# Patient Record
Sex: MALE | Race: WHITE | NOT HISPANIC OR LATINO | URBAN - METROPOLITAN AREA
[De-identification: names, ages, dates, MRNs, and addresses within clinical notes are randomized per-mention and may not be internally consistent; named-entity substitution may affect disease eponyms.]

---

## 2017-07-28 ENCOUNTER — OUTPATIENT (OUTPATIENT)
Dept: OUTPATIENT SERVICES | Facility: HOSPITAL | Age: 57
LOS: 1 days | End: 2017-07-28
Payer: COMMERCIAL

## 2017-07-28 PROCEDURE — 70552 MRI BRAIN STEM W/DYE: CPT | Mod: 26

## 2019-07-29 ENCOUNTER — APPOINTMENT (OUTPATIENT)
Dept: UROLOGY | Facility: CLINIC | Age: 59
End: 2019-07-29
Payer: COMMERCIAL

## 2019-07-29 VITALS
HEIGHT: 70 IN | BODY MASS INDEX: 29.49 KG/M2 | SYSTOLIC BLOOD PRESSURE: 144 MMHG | HEART RATE: 69 BPM | DIASTOLIC BLOOD PRESSURE: 83 MMHG | WEIGHT: 206 LBS | TEMPERATURE: 98 F

## 2019-07-29 DIAGNOSIS — Z80.1 FAMILY HISTORY OF MALIGNANT NEOPLASM OF TRACHEA, BRONCHUS AND LUNG: ICD-10-CM

## 2019-07-29 DIAGNOSIS — Z87.891 PERSONAL HISTORY OF NICOTINE DEPENDENCE: ICD-10-CM

## 2019-07-29 DIAGNOSIS — Z78.9 OTHER SPECIFIED HEALTH STATUS: ICD-10-CM

## 2019-07-29 PROCEDURE — 99203 OFFICE O/P NEW LOW 30 MIN: CPT

## 2019-07-29 NOTE — PHYSICAL EXAM
[General Appearance - Well Developed] : well developed [General Appearance - Well Nourished] : well nourished [Normal Appearance] : normal appearance [Well Groomed] : well groomed [General Appearance - In No Acute Distress] : no acute distress [Edema] : no peripheral edema [Respiration, Rhythm And Depth] : normal respiratory rhythm and effort [Exaggerated Use Of Accessory Muscles For Inspiration] : no accessory muscle use [Abdomen Soft] : soft [Abdomen Tenderness] : non-tender [Costovertebral Angle Tenderness] : no ~M costovertebral angle tenderness [Urethral Meatus] : meatus normal [Penis Abnormality] : normal circumcised penis [Urinary Bladder Findings] : the bladder was normal on palpation [Testes Tenderness] : no tenderness of the testes [Scrotum] : the scrotum was normal [Testes Mass (___cm)] : there were no testicular masses [Anus Abnormality] : the anus and perineum were normal [Prostate Enlargement] : the prostate was not enlarged [Prostate Tenderness] : the prostate was not tender [No Prostate Nodules] : no prostate nodules [Normal Station and Gait] : the gait and station were normal for the patient's age [] : no rash [No Focal Deficits] : no focal deficits [Oriented To Time, Place, And Person] : oriented to person, place, and time [Affect] : the affect was normal [Mood] : the mood was normal [Not Anxious] : not anxious [FreeTextEntry1] : large left spermatocele

## 2019-07-29 NOTE — HISTORY OF PRESENT ILLNESS
[FreeTextEntry1] : Mr. MICHAEL JIMENEZ 59 year old  M  no PMH and no PSH. Pt comes in bc of a left swollen testicle that he has had it for 4 years. Had a ultrasound about 3 years ago and was told nothing needs to be done unless it is bothersome. Pt states size really hasn't change much in size. States he is spending more time sitting which is making it more bothersome. 07/26/19 PSA 0.3. \par

## 2019-07-29 NOTE — ASSESSMENT
[FreeTextEntry1] : Mr. MICHAEL JIMENEZ 59 year old  M  no PMH and no PSH. Pt comes in bc of a left swollen testicle that he has had it for 4 years. Had a ultrasound about 3 years ago and was told nothing needs to be done unless it is bothersome. Pt states size really hasn't change much in size. States he is spending more time sitting which is making it more bothersome. 07/26/19 PSA 0.3. \par \par Spermatocele vs hydrocele. Pt would like to discuss surgical options\par \par Plan\par scrotal US\par fu with Dr. Spangler to discuss surgery\par

## 2019-08-01 ENCOUNTER — OUTPATIENT (OUTPATIENT)
Dept: OUTPATIENT SERVICES | Facility: HOSPITAL | Age: 59
LOS: 1 days | End: 2019-08-01
Payer: COMMERCIAL

## 2019-08-01 PROCEDURE — 76870 US EXAM SCROTUM: CPT | Mod: 26

## 2019-08-08 ENCOUNTER — APPOINTMENT (OUTPATIENT)
Dept: CARDIOLOGY | Facility: CLINIC | Age: 59
End: 2019-08-08
Payer: COMMERCIAL

## 2019-08-08 VITALS
BODY MASS INDEX: 29.49 KG/M2 | DIASTOLIC BLOOD PRESSURE: 72 MMHG | HEART RATE: 74 BPM | HEIGHT: 70 IN | OXYGEN SATURATION: 97 % | WEIGHT: 206 LBS | SYSTOLIC BLOOD PRESSURE: 116 MMHG

## 2019-08-08 DIAGNOSIS — Z86.39 PERSONAL HISTORY OF OTHER ENDOCRINE, NUTRITIONAL AND METABOLIC DISEASE: ICD-10-CM

## 2019-08-08 DIAGNOSIS — Z87.898 PERSONAL HISTORY OF OTHER SPECIFIED CONDITIONS: ICD-10-CM

## 2019-08-08 PROCEDURE — 99205 OFFICE O/P NEW HI 60 MIN: CPT

## 2019-08-08 NOTE — REASON FOR VISIT
[Consultation] : a consultation regarding [Abnormal ECG] : an abnormal ECG [Hyperlipidemia] : hyperlipidemia [FreeTextEntry1] : I saw this 59-year-old asymptomatic man in cardiac consultation on  08/08/19\par He stopped smoking one year ago, has untreated hyperlipidemia, right bundle branch block on his EKG with extrasystoles.\par

## 2019-08-08 NOTE — PHYSICAL EXAM
[General Appearance - Well Developed] : well developed [Normal Appearance] : normal appearance [Well Groomed] : well groomed [General Appearance - Well Nourished] : well nourished [No Deformities] : no deformities [General Appearance - In No Acute Distress] : no acute distress [Normal Conjunctiva] : the conjunctiva exhibited no abnormalities [Eyelids - No Xanthelasma] : the eyelids demonstrated no xanthelasmas [Normal Oral Mucosa] : normal oral mucosa [No Oral Pallor] : no oral pallor [No Oral Cyanosis] : no oral cyanosis [Normal Jugular Venous A Waves Present] : normal jugular venous A waves present [Normal Jugular Venous V Waves Present] : normal jugular venous V waves present [No Jugular Venous Veliz A Waves] : no jugular venous veliz A waves [Heart Rate And Rhythm] : heart rate and rhythm were normal [Heart Sounds] : normal S1 and S2 [Murmurs] : no murmurs present [Respiration, Rhythm And Depth] : normal respiratory rhythm and effort [Exaggerated Use Of Accessory Muscles For Inspiration] : no accessory muscle use [Auscultation Breath Sounds / Voice Sounds] : lungs were clear to auscultation bilaterally [Abdomen Soft] : soft [Abdomen Tenderness] : non-tender [Abdomen Mass (___ Cm)] : no abdominal mass palpated [Abnormal Walk] : normal gait [Gait - Sufficient For Exercise Testing] : the gait was sufficient for exercise testing [Nail Clubbing] : no clubbing of the fingernails [Cyanosis, Localized] : no localized cyanosis [Petechial Hemorrhages (___cm)] : no petechial hemorrhages [Skin Color & Pigmentation] : normal skin color and pigmentation [] : no rash [No Venous Stasis] : no venous stasis [Skin Lesions] : no skin lesions [No Skin Ulcers] : no skin ulcer [No Xanthoma] : no  xanthoma was observed [Oriented To Time, Place, And Person] : oriented to person, place, and time [Affect] : the affect was normal [Mood] : the mood was normal [No Anxiety] : not feeling anxious

## 2019-08-15 ENCOUNTER — APPOINTMENT (OUTPATIENT)
Dept: CARDIOLOGY | Facility: CLINIC | Age: 59
End: 2019-08-15

## 2019-08-21 ENCOUNTER — APPOINTMENT (OUTPATIENT)
Dept: CARDIOLOGY | Facility: CLINIC | Age: 59
End: 2019-08-21

## 2019-08-27 ENCOUNTER — APPOINTMENT (OUTPATIENT)
Dept: CARDIOLOGY | Facility: CLINIC | Age: 59
End: 2019-08-27
Payer: COMMERCIAL

## 2019-08-27 PROCEDURE — 0296T: CPT | Mod: 59

## 2019-08-27 PROCEDURE — 93015 CV STRESS TEST SUPVJ I&R: CPT

## 2019-08-28 ENCOUNTER — APPOINTMENT (OUTPATIENT)
Dept: UROLOGY | Facility: CLINIC | Age: 59
End: 2019-08-28
Payer: COMMERCIAL

## 2019-08-28 VITALS
HEIGHT: 70 IN | HEART RATE: 69 BPM | TEMPERATURE: 98.4 F | BODY MASS INDEX: 29.49 KG/M2 | SYSTOLIC BLOOD PRESSURE: 147 MMHG | WEIGHT: 206 LBS | DIASTOLIC BLOOD PRESSURE: 87 MMHG

## 2019-08-28 PROCEDURE — 99214 OFFICE O/P EST MOD 30 MIN: CPT

## 2019-08-28 NOTE — ASSESSMENT
[FreeTextEntry1] : Bilateral hydroceles but only left bothers patient.\par \par Plan:\par \par left hydrocelectomy\par I have discussed the risks, benefits and alternatives with the patient who agrees to proceed.

## 2019-08-28 NOTE — HISTORY OF PRESENT ILLNESS
[FreeTextEntry1] : Patient has noted scrotal swelling for a few years.  Never bothered him.  Lately sitting has become uncomfortable.  no difficulty voiding.  enlargement has been very gradual and none recently.

## 2019-08-28 NOTE — PHYSICAL EXAM
[General Appearance - Well Developed] : well developed [Normal Appearance] : normal appearance [General Appearance - Well Nourished] : well nourished [Well Groomed] : well groomed [Abdomen Soft] : soft [General Appearance - In No Acute Distress] : no acute distress [Abdomen Tenderness] : non-tender [Costovertebral Angle Tenderness] : no ~M costovertebral angle tenderness [Urinary Bladder Findings] : the bladder was normal on palpation [Urethral Meatus] : meatus normal [Testes Mass (___cm)] : there were no testicular masses [Scrotum] : the scrotum was normal [No Prostate Nodules] : no prostate nodules [Edema] : no peripheral edema [] : no respiratory distress [Respiration, Rhythm And Depth] : normal respiratory rhythm and effort [Exaggerated Use Of Accessory Muscles For Inspiration] : no accessory muscle use [Affect] : the affect was normal [Oriented To Time, Place, And Person] : oriented to person, place, and time [Mood] : the mood was normal [Not Anxious] : not anxious [Normal Station and Gait] : the gait and station were normal for the patient's age [No Focal Deficits] : no focal deficits [No Palpable Adenopathy] : no palpable adenopathy [FreeTextEntry1] : bilateral hydroceles L>>R

## 2019-08-28 NOTE — LETTER BODY
[Dear  ___] : Dear  [unfilled], [Courtesy Letter:] : I had the pleasure of seeing your patient, [unfilled], in my office today. [Please see my note below.] : Please see my note below. [Sincerely,] : Sincerely, [FreeTextEntry3] : Ed\par \par Jluis Spangler MD\par [facility]\par  of Urology\par [employer] [facility]\par

## 2019-09-05 PROCEDURE — 0298T: CPT

## 2019-09-24 ENCOUNTER — APPOINTMENT (OUTPATIENT)
Dept: CARDIOLOGY | Facility: CLINIC | Age: 59
End: 2019-09-24

## 2020-02-04 ENCOUNTER — APPOINTMENT (OUTPATIENT)
Dept: UROLOGY | Facility: CLINIC | Age: 60
End: 2020-02-04
Payer: COMMERCIAL

## 2020-02-04 VITALS
SYSTOLIC BLOOD PRESSURE: 128 MMHG | TEMPERATURE: 98.4 F | DIASTOLIC BLOOD PRESSURE: 76 MMHG | HEART RATE: 71 BPM | WEIGHT: 206 LBS | HEIGHT: 70 IN | BODY MASS INDEX: 29.49 KG/M2

## 2020-02-04 PROCEDURE — 99214 OFFICE O/P EST MOD 30 MIN: CPT

## 2020-02-04 RX ORDER — METOPROLOL SUCCINATE 50 MG/1
50 TABLET, EXTENDED RELEASE ORAL DAILY
Qty: 90 | Refills: 3 | Status: DISCONTINUED | COMMUNITY
Start: 2019-09-18 | End: 2020-02-04

## 2020-02-04 RX ORDER — ROSUVASTATIN CALCIUM 10 MG/1
10 TABLET, FILM COATED ORAL
Qty: 60 | Refills: 3 | Status: DISCONTINUED | COMMUNITY
Start: 2019-08-08 | End: 2020-02-04

## 2020-02-04 RX ORDER — MECLIZINE HCL 25 MG
25 TABLET ORAL
Refills: 0 | Status: DISCONTINUED | COMMUNITY
End: 2020-02-04

## 2020-02-04 RX ORDER — FLUTICASONE FUROATE 27.5 UG/1
27.5 SPRAY, METERED NASAL
Refills: 0 | Status: DISCONTINUED | COMMUNITY
End: 2020-02-04

## 2020-02-04 RX ORDER — MULTIVITAMIN
CAPSULE ORAL
Refills: 0 | Status: DISCONTINUED | COMMUNITY
End: 2020-02-04

## 2020-02-04 NOTE — HISTORY OF PRESENT ILLNESS
[FreeTextEntry1] : Patietn has left hydrocele which is uncomfortable. no swollen or reddened. no urinary issues. no fevers or chills.

## 2020-02-04 NOTE — PHYSICAL EXAM
[General Appearance - Well Developed] : well developed [General Appearance - Well Nourished] : well nourished [Normal Appearance] : normal appearance [Well Groomed] : well groomed [General Appearance - In No Acute Distress] : no acute distress [Abdomen Soft] : soft [Costovertebral Angle Tenderness] : no ~M costovertebral angle tenderness [Abdomen Tenderness] : non-tender [Urethral Meatus] : meatus normal [Penis Abnormality] : normal uncircumcised penis [Urinary Bladder Findings] : the bladder was normal on palpation [FreeTextEntry1] : large left hydrocele [Scrotum] : the scrotum was normal [Edema] : no peripheral edema [Exaggerated Use Of Accessory Muscles For Inspiration] : no accessory muscle use [] : no respiratory distress [Respiration, Rhythm And Depth] : normal respiratory rhythm and effort [Oriented To Time, Place, And Person] : oriented to person, place, and time [Mood] : the mood was normal [Affect] : the affect was normal [Not Anxious] : not anxious [Normal Station and Gait] : the gait and station were normal for the patient's age [No Focal Deficits] : no focal deficits

## 2020-02-04 NOTE — ASSESSMENT
[FreeTextEntry1] : Impression:\par \par left hydrocele\par \par Plan:\par left hydrocelectomy\par I have explained the risks, benefits and alternatives.

## 2020-02-26 ENCOUNTER — OUTPATIENT (OUTPATIENT)
Dept: OUTPATIENT SERVICES | Facility: HOSPITAL | Age: 60
LOS: 1 days | End: 2020-02-26

## 2020-03-10 ENCOUNTER — APPOINTMENT (OUTPATIENT)
Dept: UROLOGY | Facility: HOSPITAL | Age: 60
End: 2020-03-10

## 2020-05-31 ENCOUNTER — OUTPATIENT (OUTPATIENT)
Dept: OUTPATIENT SERVICES | Facility: HOSPITAL | Age: 60
LOS: 1 days | End: 2020-05-31

## 2020-12-06 ENCOUNTER — APPOINTMENT (OUTPATIENT)
Dept: DISASTER EMERGENCY | Facility: CLINIC | Age: 60
End: 2020-12-06

## 2020-12-07 LAB — SARS-COV-2 N GENE NPH QL NAA+PROBE: NOT DETECTED

## 2020-12-09 ENCOUNTER — OUTPATIENT (OUTPATIENT)
Dept: OUTPATIENT SERVICES | Facility: HOSPITAL | Age: 60
LOS: 1 days | End: 2020-12-09

## 2021-11-11 ENCOUNTER — NON-APPOINTMENT (OUTPATIENT)
Age: 61
End: 2021-11-11

## 2021-11-11 ENCOUNTER — APPOINTMENT (OUTPATIENT)
Dept: CARDIOLOGY | Facility: CLINIC | Age: 61
End: 2021-11-11
Payer: COMMERCIAL

## 2021-11-11 VITALS
BODY MASS INDEX: 28.63 KG/M2 | HEART RATE: 84 BPM | HEIGHT: 70 IN | DIASTOLIC BLOOD PRESSURE: 86 MMHG | TEMPERATURE: 97.3 F | WEIGHT: 200 LBS | OXYGEN SATURATION: 98 % | SYSTOLIC BLOOD PRESSURE: 126 MMHG

## 2021-11-11 PROCEDURE — 93000 ELECTROCARDIOGRAM COMPLETE: CPT

## 2021-11-11 PROCEDURE — 99215 OFFICE O/P EST HI 40 MIN: CPT

## 2021-11-11 NOTE — DISCUSSION/SUMMARY
[Father] : father [FreeTextEntry1] : 1) Crestor 10mg QD was not tolerated\par 2) Stress test was equivical\par 3) F/U in 6 months

## 2021-11-11 NOTE — REASON FOR VISIT
[Arrhythmia/ECG Abnorrmalities] : arrhythmia/ECG abnormalities [Hypertension] : hypertension [Consultation] : a consultation regarding [Abnormal ECG] : an abnormal ECG [Hyperlipidemia] : hyperlipidemia [FreeTextEntry3] : Dr. 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  He feels an occasional skipped beat maybe twice a month\par

## 2022-06-17 ENCOUNTER — APPOINTMENT (OUTPATIENT)
Dept: THORACIC SURGERY | Facility: CLINIC | Age: 62
End: 2022-06-17
Payer: COMMERCIAL

## 2022-06-17 VITALS
SYSTOLIC BLOOD PRESSURE: 158 MMHG | TEMPERATURE: 98 F | WEIGHT: 202 LBS | HEART RATE: 72 BPM | BODY MASS INDEX: 28.98 KG/M2 | OXYGEN SATURATION: 99 % | DIASTOLIC BLOOD PRESSURE: 93 MMHG

## 2022-06-17 PROCEDURE — 99202 OFFICE O/P NEW SF 15 MIN: CPT

## 2022-06-17 NOTE — ASSESSMENT
[FreeTextEntry1] : Mr. Brown has subcentimeter lung nodules and mediastinal lymphadenopathy.  The lesions appear to be benign, but need following.  He will return in six months with a surveillance cat scan chest.

## 2022-06-17 NOTE — PHYSICAL EXAM
[Neck Appearance] : the appearance of the neck was normal [Neck Cervical Mass (___cm)] : no neck mass was observed [] : no respiratory distress [Respiration, Rhythm And Depth] : normal respiratory rhythm and effort [Exaggerated Use Of Accessory Muscles For Inspiration] : no accessory muscle use [Examination Of The Chest] : the chest was normal in appearance [Chest Visual Inspection Thoracic Asymmetry] : no chest asymmetry [Bowel Sounds] : normal bowel sounds [Abdomen Soft] : soft [Abdomen Tenderness] : non-tender [Cervical Lymph Nodes Enlarged Posterior Bilaterally] : posterior cervical [Cervical Lymph Nodes Enlarged Anterior Bilaterally] : anterior cervical [Supraclavicular Lymph Nodes Enlarged Bilaterally] : supraclavicular [Oriented To Time, Place, And Person] : oriented to person, place, and time

## 2022-06-17 NOTE — HISTORY OF PRESENT ILLNESS
[FreeTextEntry1] : Mr. JIMENEZ is a 61 year old male referred by Dr. Trinidad who presents for consultation. His past medical history includes HLD< right bundle block, vertigo found to have mediastinal lymphadenopathy and right lung nodules.  He is here for an evaluation. \par \par

## 2022-11-10 ENCOUNTER — APPOINTMENT (OUTPATIENT)
Dept: CARDIOLOGY | Facility: CLINIC | Age: 62
End: 2022-11-10

## 2022-11-10 ENCOUNTER — NON-APPOINTMENT (OUTPATIENT)
Age: 62
End: 2022-11-10

## 2022-11-10 VITALS
HEIGHT: 70 IN | OXYGEN SATURATION: 98 % | HEART RATE: 67 BPM | WEIGHT: 200 LBS | SYSTOLIC BLOOD PRESSURE: 140 MMHG | TEMPERATURE: 96.9 F | DIASTOLIC BLOOD PRESSURE: 80 MMHG | BODY MASS INDEX: 28.63 KG/M2

## 2022-11-10 DIAGNOSIS — Z00.00 ENCOUNTER FOR GENERAL ADULT MEDICAL EXAMINATION W/OUT ABNORMAL FINDINGS: ICD-10-CM

## 2022-11-10 PROCEDURE — 93000 ELECTROCARDIOGRAM COMPLETE: CPT

## 2022-11-10 PROCEDURE — 99215 OFFICE O/P EST HI 40 MIN: CPT | Mod: 25

## 2022-11-10 NOTE — REASON FOR VISIT
[Arrhythmia/ECG Abnorrmalities] : arrhythmia/ECG abnormalities [Hypertension] : hypertension [Consultation] : a consultation regarding [Abnormal ECG] : an abnormal ECG [Hyperlipidemia] : hyperlipidemia [FreeTextEntry3] : Dr. 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  He feels an occasional skipped beat maybe twice a month\par Comes in today because his blood pressure has been running high.  He is not on any medication.

## 2022-11-10 NOTE — DISCUSSION/SUMMARY
[FreeTextEntry1] : 1) Crestor 10mg QD was not tolerated\par 2) Stress test was equivical because of the RBBB\par 3) we will get a cardiac CTA to evaluate whether he needs treatment for his lipids.  If his blood pressure remains elevated he will need medication for blood pressure.

## 2022-12-02 ENCOUNTER — APPOINTMENT (OUTPATIENT)
Dept: THORACIC SURGERY | Facility: CLINIC | Age: 62
End: 2022-12-02

## 2022-12-02 VITALS
DIASTOLIC BLOOD PRESSURE: 99 MMHG | TEMPERATURE: 98.2 F | OXYGEN SATURATION: 99 % | WEIGHT: 201.99 LBS | SYSTOLIC BLOOD PRESSURE: 171 MMHG | BODY MASS INDEX: 28.92 KG/M2 | HEIGHT: 70 IN | HEART RATE: 87 BPM

## 2022-12-02 PROCEDURE — 99212 OFFICE O/P EST SF 10 MIN: CPT

## 2022-12-02 NOTE — ASSESSMENT
[FreeTextEntry1] : Mr. Brown's most recent cat scan chest shows stable subcentimeter nodules and a slight decrease in his mediastinal lymphadenopathy.  He is clinically doing well.  I will have him obtain a cat scan chest in one year for surveillance purposes.

## 2022-12-02 NOTE — HISTORY OF PRESENT ILLNESS
[FreeTextEntry1] : Mr. JIMENEZ is a 61 year old male referred by Dr. Trinidad who presents for consultation. His past medical history includes HLD, right bundle block, vertigo found to have mediastinal lymphadenopathy and right lung nodules. \par \par He returns to the office today to discuss recent surveillance imaging.

## 2023-01-03 ENCOUNTER — NON-APPOINTMENT (OUTPATIENT)
Age: 63
End: 2023-01-03

## 2023-02-09 ENCOUNTER — NON-APPOINTMENT (OUTPATIENT)
Age: 63
End: 2023-02-09

## 2023-02-09 ENCOUNTER — APPOINTMENT (OUTPATIENT)
Dept: UROLOGY | Facility: CLINIC | Age: 63
End: 2023-02-09
Payer: COMMERCIAL

## 2023-02-09 VITALS
WEIGHT: 200 LBS | SYSTOLIC BLOOD PRESSURE: 137 MMHG | HEIGHT: 70 IN | BODY MASS INDEX: 28.63 KG/M2 | TEMPERATURE: 97.3 F | HEART RATE: 75 BPM | DIASTOLIC BLOOD PRESSURE: 84 MMHG

## 2023-02-09 PROCEDURE — 99203 OFFICE O/P NEW LOW 30 MIN: CPT

## 2023-02-10 NOTE — HISTORY OF PRESENT ILLNESS
[FreeTextEntry1] : 62-year-old patient presented today to discuss the option of operation for the large left hydrocele.\par Patient is suffering from the hydrocele for at least 2 years.  He was previously seen by Dr. Magallanes and was offered the surgery but because of the situation associated with COVID-19 he decided to postpone the surgery\par Today he is ready to consider the operation.  He told that varicocele is large enough to interfere with his everyday activities

## 2023-02-10 NOTE — ASSESSMENT
[FreeTextEntry1] : Patient has a left large hydrocele.\par He already was evaluated for these by Dr. Spangler\par I again is a combined hydrocele with several cavities that are  by each other by septations there is also the signs of debris's in the hydrocele.\par We discussed the process of the operation and the postoperative healing.\par I informed patient that he could not be actively involved in the physical activity and exercise connected with the bicycle and bike riding [place] driving for the long distance and lifting heavy objects for at least 2 weeks after the procedure\par I filled the paperwork and order for the operation room.

## 2023-02-10 NOTE — PHYSICAL EXAM
[General Appearance - Well Developed] : well developed [General Appearance - Well Nourished] : well nourished [Normal Appearance] : normal appearance [Well Groomed] : well groomed [General Appearance - In No Acute Distress] : no acute distress [Edema] : no peripheral edema [Respiration, Rhythm And Depth] : normal respiratory rhythm and effort [Exaggerated Use Of Accessory Muscles For Inspiration] : no accessory muscle use [Abdomen Soft] : soft [Abdomen Tenderness] : non-tender [Costovertebral Angle Tenderness] : no ~M costovertebral angle tenderness [Urethral Meatus] : meatus normal [Urinary Bladder Findings] : the bladder was normal on palpation [FreeTextEntry1] : Very large left hydrocele [Normal Station and Gait] : the gait and station were normal for the patient's age [] : no rash [No Focal Deficits] : no focal deficits [Oriented To Time, Place, And Person] : oriented to person, place, and time [Affect] : the affect was normal [Mood] : the mood was normal [Not Anxious] : not anxious [No Palpable Adenopathy] : no palpable adenopathy

## 2023-03-13 ENCOUNTER — APPOINTMENT (OUTPATIENT)
Dept: UROLOGY | Facility: HOSPITAL | Age: 63
End: 2023-03-13

## 2023-03-23 ENCOUNTER — APPOINTMENT (OUTPATIENT)
Dept: UROLOGY | Facility: CLINIC | Age: 63
End: 2023-03-23
Payer: COMMERCIAL

## 2023-03-23 VITALS
TEMPERATURE: 97.2 F | DIASTOLIC BLOOD PRESSURE: 111 MMHG | OXYGEN SATURATION: 98 % | HEIGHT: 70 IN | RESPIRATION RATE: 16 BRPM | HEART RATE: 73 BPM | SYSTOLIC BLOOD PRESSURE: 176 MMHG

## 2023-03-23 PROCEDURE — 99213 OFFICE O/P EST LOW 20 MIN: CPT | Mod: 24

## 2023-03-24 NOTE — ASSESSMENT
[FreeTextEntry1] : Plan\par tinea cruris\par start bactrim\par start nystatin\par fu 1 week with Dr. Castillo

## 2023-03-24 NOTE — HISTORY OF PRESENT ILLNESS
[FreeTextEntry1] : Pt comes in s/p hydrocelectomy on 3/13. Pt comes in for wound check.  Scrotal lesion clean dry and intake and healing well. Sight of groin lesion has slight redness consistent of tinea cruris. wound has no drainage. Incision site slightly open appx 5mm.

## 2023-03-29 ENCOUNTER — APPOINTMENT (OUTPATIENT)
Dept: UROLOGY | Facility: CLINIC | Age: 63
End: 2023-03-29
Payer: COMMERCIAL

## 2023-03-29 VITALS
TEMPERATURE: 97.3 F | HEART RATE: 71 BPM | DIASTOLIC BLOOD PRESSURE: 77 MMHG | SYSTOLIC BLOOD PRESSURE: 123 MMHG | WEIGHT: 197 LBS | BODY MASS INDEX: 28.2 KG/M2 | HEIGHT: 70 IN

## 2023-03-29 PROCEDURE — 99213 OFFICE O/P EST LOW 20 MIN: CPT | Mod: 24

## 2023-03-29 NOTE — ASSESSMENT
[FreeTextEntry1] : Patient feels good.\par We discussed the results of the pathology that showed tissue of appendix testicle.  The left inguinal skin lesion was described by pathologist as Acanthotic Seborrheic keratosis\par I will see patient in 3 weeks.

## 2023-03-29 NOTE — PHYSICAL EXAM
[General Appearance - Well Developed] : well developed [General Appearance - Well Nourished] : well nourished [Normal Appearance] : normal appearance [Well Groomed] : well groomed [General Appearance - In No Acute Distress] : no acute distress [Abdomen Soft] : soft [Abdomen Tenderness] : non-tender [Costovertebral Angle Tenderness] : no ~M costovertebral angle tenderness [Urethral Meatus] : meatus normal [Urinary Bladder Findings] : the bladder was normal on palpation [FreeTextEntry1] : no sing of infection  [Edema] : no peripheral edema [] : no respiratory distress [Respiration, Rhythm And Depth] : normal respiratory rhythm and effort [Exaggerated Use Of Accessory Muscles For Inspiration] : no accessory muscle use [Oriented To Time, Place, And Person] : oriented to person, place, and time [Affect] : the affect was normal [Mood] : the mood was normal [Not Anxious] : not anxious [Normal Station and Gait] : the gait and station were normal for the patient's age [No Focal Deficits] : no focal deficits [No Palpable Adenopathy] : no palpable adenopathy

## 2023-03-29 NOTE — HISTORY OF PRESENT ILLNESS
[FreeTextEntry1] : 62-year-old patient presented today for the postoperative follow-up.\par The procedure of left hydrocelectomy and excision of the suspicious left inguinal skin wart was done nearly 2 weeks ago.\par Patient feels fine his only concern is the pain and irritation of the left inguinal wound after the excision of the skin tag\par No chills no fever

## 2023-04-19 ENCOUNTER — APPOINTMENT (OUTPATIENT)
Dept: UROLOGY | Facility: CLINIC | Age: 63
End: 2023-04-19
Payer: COMMERCIAL

## 2023-04-19 VITALS
HEIGHT: 70 IN | WEIGHT: 197 LBS | TEMPERATURE: 97.3 F | HEART RATE: 73 BPM | SYSTOLIC BLOOD PRESSURE: 151 MMHG | DIASTOLIC BLOOD PRESSURE: 81 MMHG | BODY MASS INDEX: 28.2 KG/M2

## 2023-04-19 PROCEDURE — 99213 OFFICE O/P EST LOW 20 MIN: CPT | Mod: 24

## 2023-04-20 NOTE — ASSESSMENT
[FreeTextEntry1] : Patient postoperative wound showed no sign of infection however the skin in the left inguinal area has a sign of this infection.  We started with the treatment.

## 2023-04-20 NOTE — PHYSICAL EXAM
[General Appearance - Well Developed] : well developed [General Appearance - Well Nourished] : well nourished [Normal Appearance] : normal appearance [Well Groomed] : well groomed [General Appearance - In No Acute Distress] : no acute distress [Abdomen Soft] : soft [Costovertebral Angle Tenderness] : no ~M costovertebral angle tenderness [Abdomen Tenderness] : non-tender [Urethral Meatus] : meatus normal [Urinary Bladder Findings] : the bladder was normal on palpation [FreeTextEntry1] : Yeast infection in the left inguinal area [] : no respiratory distress [Edema] : no peripheral edema [Respiration, Rhythm And Depth] : normal respiratory rhythm and effort [Exaggerated Use Of Accessory Muscles For Inspiration] : no accessory muscle use [Oriented To Time, Place, And Person] : oriented to person, place, and time [Affect] : the affect was normal [Mood] : the mood was normal [Not Anxious] : not anxious [Normal Station and Gait] : the gait and station were normal for the patient's age [No Focal Deficits] : no focal deficits [No Palpable Adenopathy] : no palpable adenopathy

## 2023-04-26 ENCOUNTER — APPOINTMENT (OUTPATIENT)
Dept: UROLOGY | Facility: CLINIC | Age: 63
End: 2023-04-26
Payer: COMMERCIAL

## 2023-04-26 VITALS
OXYGEN SATURATION: 98 % | DIASTOLIC BLOOD PRESSURE: 85 MMHG | WEIGHT: 197 LBS | TEMPERATURE: 96.4 F | HEART RATE: 62 BPM | BODY MASS INDEX: 28.2 KG/M2 | HEIGHT: 70 IN | SYSTOLIC BLOOD PRESSURE: 157 MMHG

## 2023-04-26 DIAGNOSIS — B37.2 CANDIDIASIS OF SKIN AND NAIL: ICD-10-CM

## 2023-04-26 DIAGNOSIS — Z98.890 OTHER SPECIFIED POSTPROCEDURAL STATES: ICD-10-CM

## 2023-04-26 PROCEDURE — 99213 OFFICE O/P EST LOW 20 MIN: CPT | Mod: 24

## 2023-04-26 NOTE — HISTORY OF PRESENT ILLNESS
[FreeTextEntry1] : 62 year-old patient presented today for the postoperative follow-up.\par Nearly 6 weeks ago we did left hydrocelectomy and excision of the suspicious left inguinal skin wart \par His pathology showed benign lesion\par His main concern today is a exacerbation of the yeast infection in the left inguinal area and scrotum that causes uncomfortable feelings and sometimes pain\par We started treatment with the fluconazole and patient today feels much better

## 2023-04-26 NOTE — PHYSICAL EXAM
[General Appearance - Well Developed] : well developed [General Appearance - Well Nourished] : well nourished [Normal Appearance] : normal appearance [Well Groomed] : well groomed [General Appearance - In No Acute Distress] : no acute distress [Abdomen Soft] : soft [Abdomen Tenderness] : non-tender [Costovertebral Angle Tenderness] : no ~M costovertebral angle tenderness [Urethral Meatus] : meatus normal [Urinary Bladder Findings] : the bladder was normal on palpation [FreeTextEntry1] : Significant improvement of infection in the left inguinal area [Edema] : no peripheral edema [] : no respiratory distress [Respiration, Rhythm And Depth] : normal respiratory rhythm and effort [Exaggerated Use Of Accessory Muscles For Inspiration] : no accessory muscle use [Oriented To Time, Place, And Person] : oriented to person, place, and time [Affect] : the affect was normal [Mood] : the mood was normal [Not Anxious] : not anxious [Normal Station and Gait] : the gait and station were normal for the patient's age [No Focal Deficits] : no focal deficits [No Palpable Adenopathy] : no palpable adenopathy

## 2023-04-26 NOTE — ASSESSMENT
[FreeTextEntry1] : Patient feels much better.\par I recommended him just to continue with the antifungal creams.\par I will see him as needed

## 2023-08-22 ENCOUNTER — APPOINTMENT (OUTPATIENT)
Dept: CARDIOLOGY | Facility: CLINIC | Age: 63
End: 2023-08-22
Payer: MEDICARE

## 2023-08-22 VITALS — HEART RATE: 88 BPM | BODY MASS INDEX: 29.85 KG/M2 | WEIGHT: 208 LBS | OXYGEN SATURATION: 95 %

## 2023-08-22 DIAGNOSIS — N50.89 OTHER SPECIFIED DISORDERS OF THE MALE GENITAL ORGANS: ICD-10-CM

## 2023-08-22 DIAGNOSIS — I45.10 UNSPECIFIED RIGHT BUNDLE-BRANCH BLOCK: ICD-10-CM

## 2023-08-22 DIAGNOSIS — N43.40 SPERMATOCELE OF EPIDIDYMIS, UNSPECIFIED: ICD-10-CM

## 2023-08-22 DIAGNOSIS — B37.2 CANDIDIASIS OF SKIN AND NAIL: ICD-10-CM

## 2023-08-22 DIAGNOSIS — N43.3 HYDROCELE, UNSPECIFIED: ICD-10-CM

## 2023-08-22 DIAGNOSIS — I49.8 OTHER SPECIFIED CARDIAC ARRHYTHMIAS: ICD-10-CM

## 2023-08-22 DIAGNOSIS — Z01.818 ENCOUNTER FOR OTHER PREPROCEDURAL EXAMINATION: ICD-10-CM

## 2023-08-22 PROCEDURE — 99215 OFFICE O/P EST HI 40 MIN: CPT

## 2023-08-22 RX ORDER — DILTIAZEM HYDROCHLORIDE 120 MG/1
120 TABLET ORAL DAILY
Refills: 0 | Status: DISCONTINUED | COMMUNITY
End: 2023-08-22

## 2023-08-22 NOTE — HISTORY OF PRESENT ILLNESS
[FreeTextEntry1] :  63 years old  gentleman with history of abnormal EKG with right bundle branch block, PAF came for cardiac follow-up after discharge from [facility].  He was admitted on August 14, 2023 for chest pain, MI was ruled out.  I have reviewed the chart.  He was also diagnosed with new onset atrial fibrillation.  He was started on Eliquis for outpatient work-up.  He does get short of breath on more than usual exertion.  He denies any chest pain.  He denies PND, orthopnea, diaphoresis, dizziness, palpitation.  He has bilateral edema of the legs.  He has all the symptoms of sleep apnea syndrome including habitual snoring, daytime fatigue/somnolence.  No prior history of CHF, MI, syncope.  Echo in the hospital confirmed cardiomyopathy

## 2023-08-22 NOTE — REASON FOR VISIT
[Arrhythmia/ECG Abnorrmalities] : arrhythmia/ECG abnormalities [Hypertension] : hypertension [Abnormal ECG] : an abnormal ECG [FreeTextEntry3] : Dr. Trinidad

## 2023-08-22 NOTE — ASSESSMENT
[FreeTextEntry1] : Paroxysmal atrial fibrillation -I discussed diltiazem due to edema of the legs.  Continue metoprolol for rate control.  Continue Eliquis to prevent thromboembolism.  Cleared for DARRIN guided DC cardioversion has been discussed with him at length.  He understands risk, benefits, complex, indications.  He is agreeable and it will be planned over the next few days.  Cardiomyopathy -I discontinue diltiazem.  Asked continue metoprolol.  I have started him on Entresto 24/26 mg 1 tablet twice daily.  We will change metoprolol to Coreg in future.  There is no evidence of volume overload.  History of statin intolerance   Clinically no evidence of ACS or CHF.  Risk factor modification has been discussed with the great length in regular walking, compliance to medication, low-salt diet, alcohol abstinence..

## 2023-09-14 ENCOUNTER — APPOINTMENT (OUTPATIENT)
Dept: CARDIOLOGY | Facility: CLINIC | Age: 63
End: 2023-09-14
Payer: MEDICARE

## 2023-09-14 ENCOUNTER — NON-APPOINTMENT (OUTPATIENT)
Age: 63
End: 2023-09-14

## 2023-09-14 VITALS
HEART RATE: 93 BPM | SYSTOLIC BLOOD PRESSURE: 116 MMHG | WEIGHT: 194 LBS | OXYGEN SATURATION: 99 % | DIASTOLIC BLOOD PRESSURE: 78 MMHG | BODY MASS INDEX: 29.4 KG/M2 | HEIGHT: 68 IN

## 2023-09-14 PROCEDURE — 99215 OFFICE O/P EST HI 40 MIN: CPT

## 2023-09-14 PROCEDURE — 93000 ELECTROCARDIOGRAM COMPLETE: CPT

## 2023-09-16 ENCOUNTER — LABORATORY RESULT (OUTPATIENT)
Age: 63
End: 2023-09-16

## 2023-09-25 ENCOUNTER — OUTPATIENT (OUTPATIENT)
Dept: OUTPATIENT SERVICES | Facility: HOSPITAL | Age: 63
LOS: 1 days | End: 2023-09-25
Payer: MEDICARE

## 2023-09-25 DIAGNOSIS — G47.33 OBSTRUCTIVE SLEEP APNEA (ADULT) (PEDIATRIC): ICD-10-CM

## 2023-09-25 PROCEDURE — 95800 SLP STDY UNATTENDED: CPT

## 2023-09-25 PROCEDURE — G0400: CPT | Mod: 26

## 2023-10-03 ENCOUNTER — APPOINTMENT (OUTPATIENT)
Dept: CARDIOLOGY | Facility: CLINIC | Age: 63
End: 2023-10-03

## 2023-10-05 ENCOUNTER — APPOINTMENT (OUTPATIENT)
Dept: CARDIOLOGY | Facility: CLINIC | Age: 63
End: 2023-10-05
Payer: MEDICARE

## 2023-10-05 ENCOUNTER — NON-APPOINTMENT (OUTPATIENT)
Age: 63
End: 2023-10-05

## 2023-10-05 VITALS
HEIGHT: 68 IN | SYSTOLIC BLOOD PRESSURE: 124 MMHG | OXYGEN SATURATION: 99 % | BODY MASS INDEX: 28.34 KG/M2 | WEIGHT: 187 LBS | HEART RATE: 115 BPM | DIASTOLIC BLOOD PRESSURE: 72 MMHG

## 2023-10-05 PROCEDURE — 99214 OFFICE O/P EST MOD 30 MIN: CPT | Mod: 25

## 2023-10-05 PROCEDURE — 93000 ELECTROCARDIOGRAM COMPLETE: CPT

## 2023-11-03 ENCOUNTER — APPOINTMENT (OUTPATIENT)
Dept: ELECTROPHYSIOLOGY | Facility: CLINIC | Age: 63
End: 2023-11-03
Payer: MEDICARE

## 2023-11-03 VITALS
WEIGHT: 184 LBS | SYSTOLIC BLOOD PRESSURE: 150 MMHG | HEART RATE: 80 BPM | HEIGHT: 68 IN | DIASTOLIC BLOOD PRESSURE: 84 MMHG | BODY MASS INDEX: 27.89 KG/M2 | OXYGEN SATURATION: 98 %

## 2023-11-07 ENCOUNTER — NON-APPOINTMENT (OUTPATIENT)
Age: 63
End: 2023-11-07

## 2023-11-07 ENCOUNTER — APPOINTMENT (OUTPATIENT)
Dept: CARDIOLOGY | Facility: CLINIC | Age: 63
End: 2023-11-07
Payer: MEDICARE

## 2023-11-07 VITALS
WEIGHT: 190 LBS | DIASTOLIC BLOOD PRESSURE: 70 MMHG | OXYGEN SATURATION: 98 % | SYSTOLIC BLOOD PRESSURE: 124 MMHG | BODY MASS INDEX: 28.89 KG/M2 | HEART RATE: 78 BPM

## 2023-11-07 DIAGNOSIS — R07.89 OTHER CHEST PAIN: ICD-10-CM

## 2023-11-07 PROCEDURE — 99214 OFFICE O/P EST MOD 30 MIN: CPT

## 2023-11-07 RX ORDER — SACUBITRIL AND VALSARTAN 49; 51 MG/1; MG/1
49-51 TABLET, FILM COATED ORAL TWICE DAILY
Qty: 180 | Refills: 3 | Status: ACTIVE | COMMUNITY
Start: 1900-01-01 | End: 1900-01-01

## 2023-11-08 ENCOUNTER — APPOINTMENT (OUTPATIENT)
Dept: ELECTROPHYSIOLOGY | Facility: CLINIC | Age: 63
End: 2023-11-08
Payer: MEDICARE

## 2023-11-08 VITALS
OXYGEN SATURATION: 95 % | DIASTOLIC BLOOD PRESSURE: 76 MMHG | BODY MASS INDEX: 28.79 KG/M2 | HEIGHT: 68 IN | SYSTOLIC BLOOD PRESSURE: 120 MMHG | HEART RATE: 77 BPM | WEIGHT: 190 LBS

## 2023-11-08 DIAGNOSIS — G47.33 OBSTRUCTIVE SLEEP APNEA (ADULT) (PEDIATRIC): ICD-10-CM

## 2023-11-08 PROCEDURE — 99204 OFFICE O/P NEW MOD 45 MIN: CPT | Mod: 25

## 2023-11-08 PROCEDURE — 93000 ELECTROCARDIOGRAM COMPLETE: CPT

## 2023-11-10 ENCOUNTER — APPOINTMENT (OUTPATIENT)
Dept: CARDIOLOGY | Facility: CLINIC | Age: 63
End: 2023-11-10
Payer: MEDICARE

## 2023-11-10 PROCEDURE — 93306 TTE W/DOPPLER COMPLETE: CPT

## 2023-11-20 PROBLEM — G47.33 OSA (OBSTRUCTIVE SLEEP APNEA): Status: ACTIVE | Noted: 2023-08-22

## 2023-12-13 NOTE — REVIEW OF SYSTEMS
[Feeling Fatigued] : not feeling fatigued [SOB] : no shortness of breath [Cough] : no cough [Abdominal Pain] : no abdominal pain [Dizziness] : no dizziness [Easy Bleeding] : no tendency for easy bleeding

## 2023-12-13 NOTE — HISTORY OF PRESENT ILLNESS
[FreeTextEntry1] : Patient is a 63-year-old man who is seen in evaluation regarding atrial fibrillation. AF started over this past summer ( 2023): He was having some chest discomfort/shortness of breath and he called his physician recommended he go to emergency room.  He was picked up by EMT who then told him that he had A-fib.  He has had prior intermittent palpitations.  Patient was treated with furosemide as well and his diltiazem.  He was then noted to have a right bundle branch block as well.  His TSH was reported normal.  His troponins were normal.  D-dimers were elevated.  He had a CTA of the chest which was negative.  At that time he had had  moderate alcohol intake.  The patient had an echocardiogram performed that showed an EF of 35 to 40%.  He was anticoagulated with Eliquis 5 mg twice daily treated with diltiazem 120 mg 24 hours and metoprolol tartrate 50 mg twice daily. He underwent a DARRIN cardioversion August 25, 2023.  The patient was seen in follow-up September 14, 2023 and was noted to be in recurrent A-fib again with symptoms of reduced functional capacity.  He was then started on amiodarone.  He was scheduled to have another cardioversion procedure but on the day of the procedure he was noted to be in sinus rhythm.  He again saw his cardiologist on October 5, 2023 and again was noted to be in A-fib with moderate ventricular response.  He was maintained on amiodarone 200 mg/day.  His diltiazem was discontinued because of cardiomyopathy and symptoms.  Patient remained on Eliquis Entresto and metoprolol.  The patient was seen yesterday by his cardiologist and again A-fib was noted.  Current symptoms: The patient is a  and has been doing a lot of activities recently including walk-in and has no symptoms currently but previously of the summer he had had marked reduction in his functional capacity with fatigue and shortness of breath.  The patient is not aware of A-fib currently  He was consuming a moderate amount of alcohol up to the summer when his A-fib was diagnosed.  Since then he has markedly reduced his amount of alcohol intake.  Is also dropped about 25 pounds since then. He has a prior history of hypertension and moderate obstructive sleep apnea.  Echocardiogram performed 8/14/2023 showed EF 35 to 40% grade 1 diastolic dysfunction, right atrium was severely dilated, severe tricuspid regurg and moderate mitral regurg.  Sleep study performed 9/25/2023 showed an AHI of 17.7.  Coronary CT performed 12/19/2022: Calcium score 0 no CT evidence of coronary disease.  Multiple prominent borderline enlarged mediastinal and hilar lymph nodes bilaterally nonspecific 5 mm pulmonary nodule noted.  So thoracic surgery 6/17/2022 because of mediastinal lymphadenopathy and right lung nodule.  The feeling was the lesions were benign but needs surveillance CT scans.

## 2023-12-13 NOTE — DISCUSSION/SUMMARY
[FreeTextEntry1] : Impression this is a 63-year-old  who is very active who presented with symptoms of shortness of breath fatigue and chest discomfort in December 2023.  He was noted to be in A-fib with rapid ventricular response.  Patient was started on anticoagulation and rate controlled and had had a subsequent DARRIN cardioversion.  He had a recurrence and was started on amiodarone and prior to his second cardioversion he had spontaneously converted.  He has been doing well since with improvement in symptoms.  Of note the patient has had decreased left ventricular function with an EF 35 to 40%.  He was diuresed and started on Entresto.  He is still on amiodarone 200 mg/day.  Patient is on metoprolol 50 mg/day.  Calcium channel blocker was discontinued.  He has had a baseline right bundle branch block.  His echocardiogram had shown EF 35 to 40%, severely dilated right atrium with tricuspid regurgitation and moderate mitral regurgitation.  The left atrial volume index was not commented on.  Presumably this patient had A-fib and a tachy myopathy.  They might have been contribution from his alcohol intake as well.  He is now having difficulty maintaining sinus rhythm on amiodarone.  The patient would benefit from maintenance of sinus rhythm given the fact that his rate control is not complete.  discussed option regarding catheter ablation - he is deciding.

## 2023-12-29 PROBLEM — R59.0 LYMPHADENOPATHY, MEDIASTINAL: Status: ACTIVE | Noted: 2022-11-30

## 2023-12-29 PROBLEM — R91.1 LUNG NODULE: Status: ACTIVE | Noted: 2023-12-29

## 2023-12-29 NOTE — DATA REVIEWED
[FreeTextEntry1] : 11/17/2023 [facility]. Name: Hendricks Andrea [facility] Phys[facility] Address: [address] [place] [address], [facility]. Phone: [phone] CT-CHEST NON CONTRAST  History: Z87.891 Smoker prior R59.9  Technique: CT scan of the chest was performed without contrast. Axial, sagittal and coronal images were reconstructed. This study was performed using automatic exposure control and an iterative reconstruction technique (radiation dose reduction software) to obtain a diagnostic image quality scan with patient dose as low as reasonably achievable. The mA and kV were adjusted according to patient size. The administered radiation dose was 4.13 mSv.   COMPARISON: CT 11/28/2022.  THYROID: The visualized thyroid gland is unremarkable.  MEDIASTINUM / AIDEN / LYMPH NODES: Stable reactive-appearing mediastinal nodes, without suspicious lymphadenopathy or mass.  HEART / VASCULAR STRUCTURES: Normal heart size. No pericardial effusion. Normal caliber of the aorta and pulmonary artery.  LUNGS: There is bronchial thickening and a stable lymph node abutting the minor fissure (series 15 image 175), without suspicious nodule or infiltrate.  PLEURA: There is no pleural effusion.  UPPER ABDOMEN: Punctate left kidney stone.  BONES: No acute fracture or aggressive focal lesion noted.   IMPRESSION:   Stable reactive mediastinal lymph nodes.  Bronchial thickening, without suspicious nodule.  Signed by: Kingsley Mariee MD Signed Date: 11/22/2023 7:31 PM EST    SIGNED BY: Kingsley Mariee M.D., Ext. [phone] 11/22/2023 07:31 PM

## 2023-12-29 NOTE — REASON FOR VISIT
[Home] : at home, [unfilled] , at the time of the visit. [Medical Office: ([facility])___] : at the medical office located in  [This encounter was initiated by telehealth (audio with video) and converted to telephone (audio only) due to technical difficulties.] : This encounter was initiated by telehealth (audio with video) and converted to telephone (audio only) due to technical difficulties. [Follow-Up: _____] : a [unfilled] follow-up visit

## 2023-12-29 NOTE — HISTORY OF PRESENT ILLNESS
[FreeTextEntry1] : Mr. MICHAEL JIMENEZ is a 63 year male who presents today for follow-up. Previously, he has been followed for mediastinal lymphadenopathy and right lung nodules. He was initially seen 6/2022 and at his last visit in December 2022, he was noted to have stable subcentimeter nodules with a slight decrease in mediastinal lymphadenopathy. He presents today to review and discuss surveillance annual CT Chest results.    Additional past medical history includes hyperlipidemia, right bundle branch block and vertigo   Today, the patient reports         Referring: Vivi

## 2024-01-04 ENCOUNTER — APPOINTMENT (OUTPATIENT)
Dept: THORACIC SURGERY | Facility: CLINIC | Age: 64
End: 2024-01-04

## 2024-01-04 DIAGNOSIS — R91.1 SOLITARY PULMONARY NODULE: ICD-10-CM

## 2024-01-04 DIAGNOSIS — R59.0 LOCALIZED ENLARGED LYMPH NODES: ICD-10-CM

## 2024-01-10 ENCOUNTER — APPOINTMENT (OUTPATIENT)
Dept: CARDIOLOGY | Facility: CLINIC | Age: 64
End: 2024-01-10

## 2024-01-11 ENCOUNTER — OUTPATIENT (OUTPATIENT)
Dept: OUTPATIENT SERVICES | Facility: HOSPITAL | Age: 64
LOS: 1 days | End: 2024-01-11
Payer: MEDICARE

## 2024-01-11 VITALS
OXYGEN SATURATION: 98 % | WEIGHT: 187.39 LBS | RESPIRATION RATE: 16 BRPM | SYSTOLIC BLOOD PRESSURE: 120 MMHG | TEMPERATURE: 98 F | HEIGHT: 68 IN | DIASTOLIC BLOOD PRESSURE: 75 MMHG | HEART RATE: 72 BPM

## 2024-01-11 DIAGNOSIS — Z01.818 ENCOUNTER FOR OTHER PREPROCEDURAL EXAMINATION: ICD-10-CM

## 2024-01-11 DIAGNOSIS — Z98.890 OTHER SPECIFIED POSTPROCEDURAL STATES: Chronic | ICD-10-CM

## 2024-01-11 LAB
ANION GAP SERPL CALC-SCNC: 12 MMOL/L — SIGNIFICANT CHANGE UP (ref 5–17)
ANION GAP SERPL CALC-SCNC: 12 MMOL/L — SIGNIFICANT CHANGE UP (ref 5–17)
APTT BLD: 33.8 SEC — SIGNIFICANT CHANGE UP (ref 24.5–35.6)
APTT BLD: 33.8 SEC — SIGNIFICANT CHANGE UP (ref 24.5–35.6)
BASOPHILS # BLD AUTO: 0.05 K/UL — SIGNIFICANT CHANGE UP (ref 0–0.2)
BASOPHILS # BLD AUTO: 0.05 K/UL — SIGNIFICANT CHANGE UP (ref 0–0.2)
BASOPHILS NFR BLD AUTO: 0.7 % — SIGNIFICANT CHANGE UP (ref 0–2)
BASOPHILS NFR BLD AUTO: 0.7 % — SIGNIFICANT CHANGE UP (ref 0–2)
BLD GP AB SCN SERPL QL: SIGNIFICANT CHANGE UP
BLD GP AB SCN SERPL QL: SIGNIFICANT CHANGE UP
BUN SERPL-MCNC: 18.6 MG/DL — SIGNIFICANT CHANGE UP (ref 8–20)
BUN SERPL-MCNC: 18.6 MG/DL — SIGNIFICANT CHANGE UP (ref 8–20)
CALCIUM SERPL-MCNC: 9.6 MG/DL — SIGNIFICANT CHANGE UP (ref 8.4–10.5)
CALCIUM SERPL-MCNC: 9.6 MG/DL — SIGNIFICANT CHANGE UP (ref 8.4–10.5)
CHLORIDE SERPL-SCNC: 102 MMOL/L — SIGNIFICANT CHANGE UP (ref 96–108)
CHLORIDE SERPL-SCNC: 102 MMOL/L — SIGNIFICANT CHANGE UP (ref 96–108)
CHOLEST SERPL-MCNC: 282 MG/DL — HIGH
CHOLEST SERPL-MCNC: 282 MG/DL — HIGH
CO2 SERPL-SCNC: 27 MMOL/L — SIGNIFICANT CHANGE UP (ref 22–29)
CO2 SERPL-SCNC: 27 MMOL/L — SIGNIFICANT CHANGE UP (ref 22–29)
CREAT SERPL-MCNC: 1.11 MG/DL — SIGNIFICANT CHANGE UP (ref 0.5–1.3)
CREAT SERPL-MCNC: 1.11 MG/DL — SIGNIFICANT CHANGE UP (ref 0.5–1.3)
EGFR: 74 ML/MIN/1.73M2 — SIGNIFICANT CHANGE UP
EGFR: 74 ML/MIN/1.73M2 — SIGNIFICANT CHANGE UP
EOSINOPHIL # BLD AUTO: 0.23 K/UL — SIGNIFICANT CHANGE UP (ref 0–0.5)
EOSINOPHIL # BLD AUTO: 0.23 K/UL — SIGNIFICANT CHANGE UP (ref 0–0.5)
EOSINOPHIL NFR BLD AUTO: 3.4 % — SIGNIFICANT CHANGE UP (ref 0–6)
EOSINOPHIL NFR BLD AUTO: 3.4 % — SIGNIFICANT CHANGE UP (ref 0–6)
GLUCOSE SERPL-MCNC: 98 MG/DL — SIGNIFICANT CHANGE UP (ref 70–99)
GLUCOSE SERPL-MCNC: 98 MG/DL — SIGNIFICANT CHANGE UP (ref 70–99)
HCT VFR BLD CALC: 49.9 % — SIGNIFICANT CHANGE UP (ref 39–50)
HCT VFR BLD CALC: 49.9 % — SIGNIFICANT CHANGE UP (ref 39–50)
HDLC SERPL-MCNC: 74 MG/DL — SIGNIFICANT CHANGE UP
HDLC SERPL-MCNC: 74 MG/DL — SIGNIFICANT CHANGE UP
HGB BLD-MCNC: 16.4 G/DL — SIGNIFICANT CHANGE UP (ref 13–17)
HGB BLD-MCNC: 16.4 G/DL — SIGNIFICANT CHANGE UP (ref 13–17)
IMM GRANULOCYTES NFR BLD AUTO: 0.6 % — SIGNIFICANT CHANGE UP (ref 0–0.9)
IMM GRANULOCYTES NFR BLD AUTO: 0.6 % — SIGNIFICANT CHANGE UP (ref 0–0.9)
INR BLD: 1.04 RATIO — SIGNIFICANT CHANGE UP (ref 0.85–1.18)
INR BLD: 1.04 RATIO — SIGNIFICANT CHANGE UP (ref 0.85–1.18)
LIPID PNL WITH DIRECT LDL SERPL: 171 MG/DL — HIGH
LIPID PNL WITH DIRECT LDL SERPL: 171 MG/DL — HIGH
LYMPHOCYTES # BLD AUTO: 2.12 K/UL — SIGNIFICANT CHANGE UP (ref 1–3.3)
LYMPHOCYTES # BLD AUTO: 2.12 K/UL — SIGNIFICANT CHANGE UP (ref 1–3.3)
LYMPHOCYTES # BLD AUTO: 31.3 % — SIGNIFICANT CHANGE UP (ref 13–44)
LYMPHOCYTES # BLD AUTO: 31.3 % — SIGNIFICANT CHANGE UP (ref 13–44)
MAGNESIUM SERPL-MCNC: 2.1 MG/DL — SIGNIFICANT CHANGE UP (ref 1.6–2.6)
MAGNESIUM SERPL-MCNC: 2.1 MG/DL — SIGNIFICANT CHANGE UP (ref 1.6–2.6)
MCHC RBC-ENTMCNC: 31.8 PG — SIGNIFICANT CHANGE UP (ref 27–34)
MCHC RBC-ENTMCNC: 31.8 PG — SIGNIFICANT CHANGE UP (ref 27–34)
MCHC RBC-ENTMCNC: 32.9 GM/DL — SIGNIFICANT CHANGE UP (ref 32–36)
MCHC RBC-ENTMCNC: 32.9 GM/DL — SIGNIFICANT CHANGE UP (ref 32–36)
MCV RBC AUTO: 96.7 FL — SIGNIFICANT CHANGE UP (ref 80–100)
MCV RBC AUTO: 96.7 FL — SIGNIFICANT CHANGE UP (ref 80–100)
MONOCYTES # BLD AUTO: 0.64 K/UL — SIGNIFICANT CHANGE UP (ref 0–0.9)
MONOCYTES # BLD AUTO: 0.64 K/UL — SIGNIFICANT CHANGE UP (ref 0–0.9)
MONOCYTES NFR BLD AUTO: 9.5 % — SIGNIFICANT CHANGE UP (ref 2–14)
MONOCYTES NFR BLD AUTO: 9.5 % — SIGNIFICANT CHANGE UP (ref 2–14)
NEUTROPHILS # BLD AUTO: 3.69 K/UL — SIGNIFICANT CHANGE UP (ref 1.8–7.4)
NEUTROPHILS # BLD AUTO: 3.69 K/UL — SIGNIFICANT CHANGE UP (ref 1.8–7.4)
NEUTROPHILS NFR BLD AUTO: 54.5 % — SIGNIFICANT CHANGE UP (ref 43–77)
NEUTROPHILS NFR BLD AUTO: 54.5 % — SIGNIFICANT CHANGE UP (ref 43–77)
NON HDL CHOLESTEROL: 208 MG/DL — HIGH
NON HDL CHOLESTEROL: 208 MG/DL — HIGH
PLATELET # BLD AUTO: 261 K/UL — SIGNIFICANT CHANGE UP (ref 150–400)
PLATELET # BLD AUTO: 261 K/UL — SIGNIFICANT CHANGE UP (ref 150–400)
POTASSIUM SERPL-MCNC: 4.8 MMOL/L — SIGNIFICANT CHANGE UP (ref 3.5–5.3)
POTASSIUM SERPL-MCNC: 4.8 MMOL/L — SIGNIFICANT CHANGE UP (ref 3.5–5.3)
POTASSIUM SERPL-SCNC: 4.8 MMOL/L — SIGNIFICANT CHANGE UP (ref 3.5–5.3)
POTASSIUM SERPL-SCNC: 4.8 MMOL/L — SIGNIFICANT CHANGE UP (ref 3.5–5.3)
PROTHROM AB SERPL-ACNC: 11.5 SEC — SIGNIFICANT CHANGE UP (ref 9.5–13)
PROTHROM AB SERPL-ACNC: 11.5 SEC — SIGNIFICANT CHANGE UP (ref 9.5–13)
RBC # BLD: 5.16 M/UL — SIGNIFICANT CHANGE UP (ref 4.2–5.8)
RBC # BLD: 5.16 M/UL — SIGNIFICANT CHANGE UP (ref 4.2–5.8)
RBC # FLD: 16.9 % — HIGH (ref 10.3–14.5)
RBC # FLD: 16.9 % — HIGH (ref 10.3–14.5)
SODIUM SERPL-SCNC: 140 MMOL/L — SIGNIFICANT CHANGE UP (ref 135–145)
SODIUM SERPL-SCNC: 140 MMOL/L — SIGNIFICANT CHANGE UP (ref 135–145)
TRIGL SERPL-MCNC: 183 MG/DL — HIGH
TRIGL SERPL-MCNC: 183 MG/DL — HIGH
WBC # BLD: 6.77 K/UL — SIGNIFICANT CHANGE UP (ref 3.8–10.5)
WBC # BLD: 6.77 K/UL — SIGNIFICANT CHANGE UP (ref 3.8–10.5)
WBC # FLD AUTO: 6.77 K/UL — SIGNIFICANT CHANGE UP (ref 3.8–10.5)
WBC # FLD AUTO: 6.77 K/UL — SIGNIFICANT CHANGE UP (ref 3.8–10.5)

## 2024-01-11 PROCEDURE — 93010 ELECTROCARDIOGRAM REPORT: CPT

## 2024-01-11 PROCEDURE — 93005 ELECTROCARDIOGRAM TRACING: CPT

## 2024-01-11 PROCEDURE — G0463: CPT

## 2024-01-11 RX ORDER — APIXABAN 2.5 MG/1
1 TABLET, FILM COATED ORAL
Refills: 0 | DISCHARGE

## 2024-01-11 RX ORDER — AMIODARONE HYDROCHLORIDE 400 MG/1
1 TABLET ORAL
Refills: 0 | DISCHARGE

## 2024-01-11 NOTE — H&P PST ADULT - CHARACTER OF SYSTOLIC MURMUR
lightheaded/dizziness/nausea x 6 weeks. pt states she is also seeing floaters murmur loudness: II/VI/apex

## 2024-01-11 NOTE — H&P PST ADULT - HISTORY OF PRESENT ILLNESS
Department of Cardiology                                                                  [facility]/[place] [address] [address] [facility]                                                            Telephone: [phone]. Fax:[phone]                                                                                    PST H & P     HPI:  Patient is a 63-year-old man who is scheduled for DARRIN and atrial fibrillation ablation.  AF started over this past summer ( 2023): He was having some chest discomfort/shortness of breath and he called his physician recommended he go to emergency room. He was picked up by EMT who then told him that he had A-fib. He has had prior intermittent palpitations. Patient was treated with furosemide as well and his diltiazem. He was then noted to have a right bundle branch block as well. His TSH was reported normal. His troponins were normal. D-dimers were elevated. He had a CTA of the chest which was negative. At that time he had had moderate alcohol intake. The patient had an echocardiogram performed that showed an EF of 35 to 40%. He was anticoagulated with Eliquis 5 mg twice daily treated with diltiazem 120 mg 24 hours and metoprolol tartrate 50 mg twice daily.  He underwent a DARRIN cardioversion August 25, 2023.  ?  The patient was seen in follow-up September 14, 2023 and was noted to be in recurrent A-fib again with symptoms of reduced functional capacity. He was then started on amiodarone. He was scheduled to have another cardioversion procedure but on the day of the procedure he was noted to be in sinus rhythm. He again saw his cardiologist on October 5, 2023 and again was noted to be in A-fib with moderate ventricular response. He was maintained on amiodarone 200 mg/day. His diltiazem was discontinued because of cardiomyopathy and symptoms. Patient remained on Eliquis Entresto and metoprolol.  ?  The patient was seen yesterday by his cardiologist and again A-fib was noted.  ?  Current symptoms: The patient is a  and has been doing a lot of activities recently including walk-in and has no symptoms currently but previously of the summer he had had marked reduction in his functional capacity with fatigue and shortness of breath. The patient is not aware of A-fib currently  ?  He was consuming a moderate amount of alcohol up to the summer when his A-fib was diagnosed. Since then he has markedly reduced his amount of alcohol intake. Is also dropped about 25 pounds since then.  He has a prior history of hypertension and moderate obstructive sleep apnea.  ?  Echocardiogram performed 8/14/2023 showed EF 35 to 40% grade 1 diastolic dysfunction, right atrium was severely dilated, severe tricuspid regurg and moderate mitral regurg.  ?  Sleep study performed 9/25/2023 showed an AHI of 17.7.  ?  Coronary CT performed 12/19/2022: Calcium score 0 no CT evidence of coronary disease. Multiple prominent borderline enlarged mediastinal and hilar lymph nodes bilaterally nonspecific 5 mm pulmonary nodule noted.  ?  So thoracic surgery 6/17/2022 because of mediastinal lymphadenopathy and right lung nodule. The feeling was the lesions were benign but needs surveillance CT scans.  ?  Heart Failure:        Systolic:        NYHA Class (within 2 weeks): II    Assessment of LVEF:       EF: 35-40%       Assessed by: echo       Date: 8/2023          Antianginal Therapies:        Beta Blockers:  metoprolol tartrate       Calcium Channel Blockers:        Long Acting Nitrates:        Ranexa:     Associated Risk Factors:        Cerebrovascular Disease: N/A       Chronic Lung Disease: N/A       Peripheral Arterial Disease: N/A       Chronic Kidney Disease (if yes, what is GFR): N/A       Uncontrolled Diabetes (if yes, what is HgbA1C or FBS): N/A       Poorly Controlled Hypertension (if yes, what is SBP): N/A       Morbid Obesity (if yes, what is BMI): N/A       History of Recent Ventricular Arrhythmia: N/A       Inability to Ambulate Safely: N/A       Need for Therapeutic Anticoagulation: Eliquis       Antiplatelet or Contrast Allergy: N/A             Antiarrhythmic Therapies: amiodarone     Anticoagulation Therapies:    Eliquis  	  ROS: as stated above, otherwise negative      	    LABS:	 	                                                                                                                     Department of Cardiology                                                                  [facility]/[place] [address] [address] [facility]                                                            Telephone: [phone]. Fax:[phone]                                                                                    PST H & P     HPI:  Patient is a 63-year-old man who is scheduled for DARRIN and atrial fibrillation ablation.  AF started over this past summer ( 2023): He was having some chest discomfort/shortness of breath and he called his physician recommended he go to emergency room. He was picked up by EMT who then told him that he had A-fib. He has had prior intermittent palpitations. Patient was treated with furosemide as well and his diltiazem. He was then noted to have a right bundle branch block as well. His TSH was reported normal. His troponins were normal. D-dimers were elevated. He had a CTA of the chest which was negative. At that time he had had moderate alcohol intake. The patient had an echocardiogram performed that showed an EF of 35 to 40%. He was anticoagulated with Eliquis 5 mg twice daily treated with diltiazem 120 mg 24 hours and metoprolol tartrate 50 mg twice daily.  He underwent a DARRIN cardioversion August 25, 2023.  ?  The patient was seen in follow-up September 14, 2023 and was noted to be in recurrent A-fib again with symptoms of reduced functional capacity. He was then started on amiodarone. He was scheduled to have another cardioversion procedure but on the day of the procedure he was noted to be in sinus rhythm. He again saw his cardiologist on October 5, 2023 and again was noted to be in A-fib with moderate ventricular response. He was maintained on amiodarone 200 mg/day. His diltiazem was discontinued because of cardiomyopathy and symptoms. Patient remained on Eliquis Entresto and metoprolol.  ?  The patient was seen yesterday by his cardiologist and again A-fib was noted.  ?  Current symptoms: The patient is a  and has been doing a lot of activities recently including walk-in and has no symptoms currently but previously of the summer he had had marked reduction in his functional capacity with fatigue and shortness of breath. The patient is not aware of A-fib currently  ?  He was consuming a moderate amount of alcohol up to the summer when his A-fib was diagnosed. Since then he has markedly reduced his amount of alcohol intake. Is also dropped about 25 pounds since then.  He has a prior history of hypertension and moderate obstructive sleep apnea.  ?  Echocardiogram performed 8/14/2023 showed EF 35 to 40% grade 1 diastolic dysfunction, right atrium was severely dilated, severe tricuspid regurg and moderate mitral regurg.  ?  Sleep study performed 9/25/2023 showed an AHI of 17.7.  ?  Coronary CT performed 12/19/2022: Calcium score 0 no CT evidence of coronary disease. Multiple prominent borderline enlarged mediastinal and hilar lymph nodes bilaterally nonspecific 5 mm pulmonary nodule noted.  ?  So thoracic surgery 6/17/2022 because of mediastinal lymphadenopathy and right lung nodule. The feeling was the lesions were benign but needs surveillance CT scans.  ?  Heart Failure:        Systolic:        NYHA Class (within 2 weeks): II    Assessment of LVEF:       EF: 35-40%       Assessed by: echo       Date: 8/2023          Antianginal Therapies:        Beta Blockers:  metoprolol tartrate       Calcium Channel Blockers:        Long Acting Nitrates:        Ranexa:     Associated Risk Factors:        Cerebrovascular Disease: N/A       Chronic Lung Disease: N/A       Peripheral Arterial Disease: N/A       Chronic Kidney Disease (if yes, what is GFR): N/A       Uncontrolled Diabetes (if yes, what is HgbA1C or FBS): N/A       Poorly Controlled Hypertension (if yes, what is SBP): N/A       Morbid Obesity (if yes, what is BMI): N/A       History of Recent Ventricular Arrhythmia: N/A       Inability to Ambulate Safely: N/A       Need for Therapeutic Anticoagulation: Eliquis       Antiplatelet or Contrast Allergy: N/A             Antiarrhythmic Therapies: amiodarone     Anticoagulation Therapies:    Eliquis  	  ROS: as stated above, otherwise negative      	    LABS:	 	                                                                                                                     Department of Cardiology                                                                  [facility]/[place] [address] [address] [facility]                                                            Telephone: [phone]. Fax:[phone]                                                                                    PST H & P     HPI:  Patient is a 63-year-old man who is scheduled for DARRIN and atrial fibrillation ablation.  AF started over this past summer ( 2023): He was having some chest discomfort/shortness of breath and he called his physician recommended he go to emergency room. He was picked up by EMT who then told him that he had A-fib. He has had prior intermittent palpitations. Patient was treated with furosemide as well and his diltiazem. He was then noted to have a right bundle branch block as well. His TSH was reported normal. His troponins were normal. D-dimers were elevated. He had a CTA of the chest which was negative. At that time he had had moderate alcohol intake. The patient had an echocardiogram performed that showed an EF of 35 to 40%. He was anticoagulated with Eliquis 5 mg twice daily treated with diltiazem 120 mg 24 hours and metoprolol tartrate 50 mg twice daily.  He underwent a DARRIN cardioversion August 25, 2023.  ?  The patient was seen in follow-up September 14, 2023 and was noted to be in recurrent A-fib again with symptoms of reduced functional capacity. He was then started on amiodarone. He was scheduled to have another cardioversion procedure but on the day of the procedure he was noted to be in sinus rhythm. He again saw his cardiologist on October 5, 2023 and again was noted to be in A-fib with moderate ventricular response. He was maintained on amiodarone 200 mg/day. His diltiazem was discontinued because of cardiomyopathy and symptoms. Patient remained on Eliquis Entresto and metoprolol.  ?  The patient was seen yesterday by his cardiologist and again A-fib was noted.  ?  Current symptoms: The patient is a  and has been doing a lot of activities recently including walk-in and has no symptoms currently but previously of the summer he had had marked reduction in his functional capacity with fatigue and shortness of breath. The patient is not aware of A-fib currently  ?  He was consuming a moderate amount of alcohol up to the summer when his A-fib was diagnosed. Since then he has markedly reduced his amount of alcohol intake. Is also dropped about 25 pounds since then.  He has a prior history of hypertension and moderate obstructive sleep apnea.  ?  Echocardiogram performed 8/14/2023 showed EF 35 to 40% grade 1 diastolic dysfunction, right atrium was severely dilated, severe tricuspid regurg and moderate mitral regurg.  ?  Sleep study performed 9/25/2023 showed an AHI of 17.7.  ?  Coronary CT performed 12/19/2022: Calcium score 0 no CT evidence of coronary disease. Multiple prominent borderline enlarged mediastinal and hilar lymph nodes bilaterally nonspecific 5 mm pulmonary nodule noted.  ?  So thoracic surgery 6/17/2022 because of mediastinal lymphadenopathy and right lung nodule. The feeling was the lesions were benign but needs surveillance CT scans.  ?  Heart Failure:        Systolic:        NYHA Class (within 2 weeks): II    Assessment of LVEF:       EF: 35-40%       Assessed by: echo       Date: 8/2023          Antianginal Therapies:        Beta Blockers:  metoprolol tartrate       Calcium Channel Blockers:        Long Acting Nitrates:        Ranexa:     Associated Risk Factors:        Cerebrovascular Disease: N/A       Chronic Lung Disease: N/A       Peripheral Arterial Disease: N/A       Chronic Kidney Disease (if yes, what is GFR): N/A       Uncontrolled Diabetes (if yes, what is HgbA1C or FBS): N/A       Poorly Controlled Hypertension (if yes, what is SBP): N/A       Morbid Obesity (if yes, what is BMI): N/A       History of Recent Ventricular Arrhythmia: N/A       Inability to Ambulate Safely: N/A       Need for Therapeutic Anticoagulation: Eliquis       Antiplatelet or Contrast Allergy: N/A             Antiarrhythmic Therapies: amiodarone     Anticoagulation Therapies:    Eliquis  	  ROS: as stated above, otherwise negative      	    LABS:	 	                          16.4   6.77  )-----------( 261      ( 11 Jan 2024 15:00 )             49.9   01-11    140  |  102  |  18.6  ----------------------------<  98  4.8   |  27.0  |  1.11    Ca    9.6      11 Jan 2024 15:00  Mg     2.1     01-11    PT/INR - ( 11 Jan 2024 15:00 )   PT: 11.5 sec;   INR: 1.04 ratio         PTT - ( 11 Jan 2024 15:00 )  PTT:33.8 sec                                                                                                                 Department of Cardiology                                                                  [facility]/[place] [address] [address] [facility]                                                            Telephone: [phone]. Fax:[phone]                                                                                    PST H & P     HPI:  Patient is a 63-year-old man who is scheduled for DARRIN and atrial fibrillation ablation.  AF started over this past summer ( 2023): He was having some chest discomfort/shortness of breath and he called his physician recommended he go to emergency room. He was picked up by EMT who then told him that he had A-fib. He has had prior intermittent palpitations. Patient was treated with furosemide as well and his diltiazem. He was then noted to have a right bundle branch block as well. His TSH was reported normal. His troponins were normal. D-dimers were elevated. He had a CTA of the chest which was negative. At that time he had had moderate alcohol intake. The patient had an echocardiogram performed that showed an EF of 35 to 40%. He was anticoagulated with Eliquis 5 mg twice daily treated with diltiazem 120 mg 24 hours and metoprolol tartrate 50 mg twice daily.  He underwent a DARRIN cardioversion August 25, 2023.  ?  The patient was seen in follow-up September 14, 2023 and was noted to be in recurrent A-fib again with symptoms of reduced functional capacity. He was then started on amiodarone. He was scheduled to have another cardioversion procedure but on the day of the procedure he was noted to be in sinus rhythm. He again saw his cardiologist on October 5, 2023 and again was noted to be in A-fib with moderate ventricular response. He was maintained on amiodarone 200 mg/day. His diltiazem was discontinued because of cardiomyopathy and symptoms. Patient remained on Eliquis Entresto and metoprolol.  ?  The patient was seen yesterday by his cardiologist and again A-fib was noted.  ?  Current symptoms: The patient is a  and has been doing a lot of activities recently including walk-in and has no symptoms currently but previously of the summer he had had marked reduction in his functional capacity with fatigue and shortness of breath. The patient is not aware of A-fib currently  ?  He was consuming a moderate amount of alcohol up to the summer when his A-fib was diagnosed. Since then he has markedly reduced his amount of alcohol intake. Is also dropped about 25 pounds since then.  He has a prior history of hypertension and moderate obstructive sleep apnea.  ?  Echocardiogram performed 8/14/2023 showed EF 35 to 40% grade 1 diastolic dysfunction, right atrium was severely dilated, severe tricuspid regurg and moderate mitral regurg.  ?  Sleep study performed 9/25/2023 showed an AHI of 17.7.  ?  Coronary CT performed 12/19/2022: Calcium score 0 no CT evidence of coronary disease. Multiple prominent borderline enlarged mediastinal and hilar lymph nodes bilaterally nonspecific 5 mm pulmonary nodule noted.  ?  So thoracic surgery 6/17/2022 because of mediastinal lymphadenopathy and right lung nodule. The feeling was the lesions were benign but needs surveillance CT scans.  ?  Heart Failure:        Systolic:        NYHA Class (within 2 weeks): II    Assessment of LVEF:       EF: 35-40%       Assessed by: echo       Date: 8/2023          Antianginal Therapies:        Beta Blockers:  metoprolol tartrate       Calcium Channel Blockers:        Long Acting Nitrates:        Ranexa:     Associated Risk Factors:        Cerebrovascular Disease: N/A       Chronic Lung Disease: N/A       Peripheral Arterial Disease: N/A       Chronic Kidney Disease (if yes, what is GFR): N/A       Uncontrolled Diabetes (if yes, what is HgbA1C or FBS): N/A       Poorly Controlled Hypertension (if yes, what is SBP): N/A       Morbid Obesity (if yes, what is BMI): N/A       History of Recent Ventricular Arrhythmia: N/A       Inability to Ambulate Safely: N/A       Need for Therapeutic Anticoagulation: Eliquis       Antiplatelet or Contrast Allergy: N/A             Antiarrhythmic Therapies: amiodarone     Anticoagulation Therapies:    Eliquis  	  ROS: as stated above, otherwise negative      	    LABS:	 	                          16.4   6.77  )-----------( 261      ( 11 Jan 2024 15:00 )             49.9   01-11    140  |  102  |  18.6  ----------------------------<  98  4.8   |  27.0  |  1.11    Ca    9.6      11 Jan 2024 15:00  Mg     2.1     01-11    PT/INR - ( 11 Jan 2024 15:00 )   PT: 11.5 sec;   INR: 1.04 ratio         PTT - ( 11 Jan 2024 15:00 )  PTT:33.8 sec

## 2024-01-11 NOTE — H&P PST ADULT - ASSESSMENT
Plan: DARRIN atrial fibrillation ablation    ASA II  Mallampati I    -Patient seen and examined  PRE-PROCEDURE ASSESSMENT  -NPO after midnight confirmed  -IV insert  -Patient seen and examined  -Labs reviewed  -Pre-procedure teaching completed with patient Pt to hold amiodarone x 2weeks and hold Eliquis am of procedure  -Questions answered    Plan: DARRIN atrial fibrillation ablation    ASA II  Mallampati I    -Patient seen and examined  PRE-PROCEDURE ASSESSMENT  -NPO after midnight confirmed  -IV insert  -Patient seen and examined  -Labs reviewed  -Pre-procedure teaching completed with patient Pt to hold amiodarone x 2weeks(pt states he forgot to D/C last dose 1/10 Dr. Fajardo aware) and hold Eliquis am of procedure  -Questions answered

## 2024-01-11 NOTE — H&P PST ADULT - NSICDXPASTMEDICALHX_GEN_ALL_CORE_FT
PAST MEDICAL HISTORY:  Atrial fibrillation     Hypertension     TEE (obstructive sleep apnea)     S/P repair of hydrocele

## 2024-01-16 ENCOUNTER — APPOINTMENT (OUTPATIENT)
Dept: CARDIOLOGY | Facility: CLINIC | Age: 64
End: 2024-01-16
Payer: MEDICARE

## 2024-01-16 ENCOUNTER — NON-APPOINTMENT (OUTPATIENT)
Age: 64
End: 2024-01-16

## 2024-01-16 VITALS
BODY MASS INDEX: 28.89 KG/M2 | HEART RATE: 112 BPM | DIASTOLIC BLOOD PRESSURE: 80 MMHG | WEIGHT: 190 LBS | OXYGEN SATURATION: 98 % | SYSTOLIC BLOOD PRESSURE: 116 MMHG

## 2024-01-16 DIAGNOSIS — E78.5 HYPERLIPIDEMIA, UNSPECIFIED: ICD-10-CM

## 2024-01-16 PROCEDURE — 99214 OFFICE O/P EST MOD 30 MIN: CPT

## 2024-01-17 NOTE — DISCUSSION/SUMMARY
[FreeTextEntry1] : Cardiomyopathy:  Thought to be tachycardia induced.  He has gone back into AF as he is off amiodarone in preparation for ablation.  HR is elevated.  Increase metoprolol tartrate to 50mg BID.  Will consider increasing Entresto post operatively.  We will change metoprolol to Coreg in future. There is no evidence of volume overload.  PAF -now has recurrence.  Tolerating AC.  Ablation scheduled in 2 days.   History of statin intolerance  Clinically no evidence of ACS or CHF.  Risk factor modification has been discussed with the great length in regular walking, compliance to medication, low-salt diet, alcohol abstinence.  F/U as scheduled.

## 2024-01-17 NOTE — PHYSICAL EXAM
[General Appearance - Well Developed] : well developed [Normal Appearance] : normal appearance [Well Groomed] : well groomed [General Appearance - Well Nourished] : well nourished [No Deformities] : no deformities [General Appearance - In No Acute Distress] : no acute distress [Normal Conjunctiva] : the conjunctiva exhibited no abnormalities [Eyelids - No Xanthelasma] : the eyelids demonstrated no xanthelasmas [Normal Oral Mucosa] : normal oral mucosa [No Oral Pallor] : no oral pallor [No Oral Cyanosis] : no oral cyanosis [Heart Sounds] : normal S1 and S2 [Murmurs] : no murmurs present [Respiration, Rhythm And Depth] : normal respiratory rhythm and effort [Exaggerated Use Of Accessory Muscles For Inspiration] : no accessory muscle use [Auscultation Breath Sounds / Voice Sounds] : lungs were clear to auscultation bilaterally [Abnormal Walk] : normal gait [Gait - Sufficient For Exercise Testing] : the gait was sufficient for exercise testing [Nail Clubbing] : no clubbing of the fingernails [Cyanosis, Localized] : no localized cyanosis [Petechial Hemorrhages (___cm)] : no petechial hemorrhages [] : no ischemic changes [Skin Color & Pigmentation] : normal skin color and pigmentation [Oriented To Time, Place, And Person] : oriented to person, place, and time [No Anxiety] : not feeling anxious [FreeTextEntry1] : Irregularly irregular rhythm.

## 2024-01-17 NOTE — HISTORY OF PRESENT ILLNESS
[FreeTextEntry1] : 63 years old  gentleman with history of abnormal EKG with right bundle branch block, PAF ,successful DC cardioversion on August 25, 2023, he remains on Eliquis came for cardiac follow-up he was found to have recurrent atrial fibrillation on 9/14/2023, was loaded with amiodarone and brought for repeat DC cardioversion on 7/20/2023 when he was confirmed to have spontaneous normal sinus rhythm and procedure work was aborted.    He is now scheduled for ablation on 1/19 with Dr. Fajardo.  He is off his amio in preparation for the procedure.   EKG today with AF and RBBB.  HR elevated.  90's-1teens.    States that he feels great.  Weight has been dropping.  He denies PND, orthopnea, diaphoresis, dizziness, palpitation.  He has no edema of the legs.  He has all the symptoms of sleep apnea syndrome including habitual snoring, daytime fatigue/somnolence.  Sleep study confirmed moderate sleep apnea syndrome but he declined for CPAP titration study  No prior history of CHF, MI, syncope.  Echo in the hospital confirmed cardiomyopathy.

## 2024-01-18 ENCOUNTER — TRANSCRIPTION ENCOUNTER (OUTPATIENT)
Age: 64
End: 2024-01-18

## 2024-01-18 ENCOUNTER — INPATIENT (INPATIENT)
Facility: HOSPITAL | Age: 64
LOS: 0 days | Discharge: HOME | DRG: 274 | End: 2024-01-19
Attending: INTERNAL MEDICINE | Admitting: INTERNAL MEDICINE
Payer: COMMERCIAL

## 2024-01-18 VITALS
HEART RATE: 88 BPM | RESPIRATION RATE: 16 BRPM | DIASTOLIC BLOOD PRESSURE: 86 MMHG | OXYGEN SATURATION: 100 % | SYSTOLIC BLOOD PRESSURE: 133 MMHG | HEIGHT: 68 IN | TEMPERATURE: 98 F | WEIGHT: 186.95 LBS

## 2024-01-18 DIAGNOSIS — I48.19 OTHER PERSISTENT ATRIAL FIBRILLATION: ICD-10-CM

## 2024-01-18 DIAGNOSIS — Z98.890 OTHER SPECIFIED POSTPROCEDURAL STATES: Chronic | ICD-10-CM

## 2024-01-18 LAB — ABO RH CONFIRMATION: SIGNIFICANT CHANGE UP

## 2024-01-18 PROCEDURE — 93325 DOPPLER ECHO COLOR FLOW MAPG: CPT | Mod: 26

## 2024-01-18 PROCEDURE — 93010 ELECTROCARDIOGRAM REPORT: CPT

## 2024-01-18 PROCEDURE — 93312 ECHO TRANSESOPHAGEAL: CPT | Mod: 26

## 2024-01-18 PROCEDURE — 93655 ICAR CATH ABLTJ DSCRT ARRHYT: CPT

## 2024-01-18 PROCEDURE — 93656 COMPRE EP EVAL ABLTJ ATR FIB: CPT

## 2024-01-18 RX ORDER — SODIUM CHLORIDE 9 MG/ML
500 INJECTION INTRAMUSCULAR; INTRAVENOUS; SUBCUTANEOUS ONCE
Refills: 0 | Status: COMPLETED | OUTPATIENT
Start: 2024-01-18 | End: 2024-01-18

## 2024-01-18 RX ORDER — FUROSEMIDE 40 MG
20 TABLET ORAL DAILY
Refills: 0 | Status: DISCONTINUED | OUTPATIENT
Start: 2024-01-19 | End: 2024-01-19

## 2024-01-18 RX ORDER — ACETAMINOPHEN 500 MG
650 TABLET ORAL EVERY 6 HOURS
Refills: 0 | Status: DISCONTINUED | OUTPATIENT
Start: 2024-01-18 | End: 2024-01-19

## 2024-01-18 RX ORDER — ONDANSETRON 8 MG/1
4 TABLET, FILM COATED ORAL EVERY 8 HOURS
Refills: 0 | Status: DISCONTINUED | OUTPATIENT
Start: 2024-01-18 | End: 2024-01-19

## 2024-01-18 RX ORDER — APIXABAN 2.5 MG/1
5 TABLET, FILM COATED ORAL
Refills: 0 | Status: DISCONTINUED | OUTPATIENT
Start: 2024-01-18 | End: 2024-01-19

## 2024-01-18 RX ORDER — AMIODARONE HYDROCHLORIDE 400 MG/1
200 TABLET ORAL DAILY
Refills: 0 | Status: DISCONTINUED | OUTPATIENT
Start: 2024-01-18 | End: 2024-01-19

## 2024-01-18 RX ORDER — SACUBITRIL AND VALSARTAN 24; 26 MG/1; MG/1
1 TABLET, FILM COATED ORAL
Refills: 0 | Status: DISCONTINUED | OUTPATIENT
Start: 2024-01-18 | End: 2024-01-19

## 2024-01-18 RX ORDER — FUROSEMIDE 40 MG
20 TABLET ORAL ONCE
Refills: 0 | Status: COMPLETED | OUTPATIENT
Start: 2024-01-18 | End: 2024-01-18

## 2024-01-18 RX ORDER — BENZOCAINE AND MENTHOL 5; 1 G/100ML; G/100ML
1 LIQUID ORAL
Refills: 0 | Status: DISCONTINUED | OUTPATIENT
Start: 2024-01-18 | End: 2024-01-19

## 2024-01-18 RX ORDER — METOPROLOL TARTRATE 50 MG
1 TABLET ORAL
Refills: 0 | DISCHARGE

## 2024-01-18 RX ORDER — SUCRALFATE 1 G
1 TABLET ORAL
Refills: 0 | Status: DISCONTINUED | OUTPATIENT
Start: 2024-01-18 | End: 2024-01-19

## 2024-01-18 RX ORDER — PANTOPRAZOLE SODIUM 20 MG/1
40 TABLET, DELAYED RELEASE ORAL
Refills: 0 | Status: DISCONTINUED | OUTPATIENT
Start: 2024-01-18 | End: 2024-01-19

## 2024-01-18 RX ORDER — ALPRAZOLAM 0.25 MG
0.25 TABLET ORAL EVERY 6 HOURS
Refills: 0 | Status: DISCONTINUED | OUTPATIENT
Start: 2024-01-18 | End: 2024-01-19

## 2024-01-18 RX ORDER — SODIUM CHLORIDE 9 MG/ML
1000 INJECTION INTRAMUSCULAR; INTRAVENOUS; SUBCUTANEOUS ONCE
Refills: 0 | Status: COMPLETED | OUTPATIENT
Start: 2024-01-18 | End: 2024-01-18

## 2024-01-18 RX ADMIN — PANTOPRAZOLE SODIUM 40 MILLIGRAM(S): 20 TABLET, DELAYED RELEASE ORAL at 17:56

## 2024-01-18 RX ADMIN — Medication 1 GRAM(S): at 17:56

## 2024-01-18 RX ADMIN — SODIUM CHLORIDE 1000 MILLILITER(S): 9 INJECTION INTRAMUSCULAR; INTRAVENOUS; SUBCUTANEOUS at 15:41

## 2024-01-18 RX ADMIN — SODIUM CHLORIDE 500 MILLILITER(S): 9 INJECTION INTRAMUSCULAR; INTRAVENOUS; SUBCUTANEOUS at 13:28

## 2024-01-18 RX ADMIN — APIXABAN 5 MILLIGRAM(S): 2.5 TABLET, FILM COATED ORAL at 18:29

## 2024-01-18 RX ADMIN — SODIUM CHLORIDE 500 MILLILITER(S): 9 INJECTION INTRAMUSCULAR; INTRAVENOUS; SUBCUTANEOUS at 14:40

## 2024-01-18 NOTE — DISCHARGE NOTE PROVIDER - HOSPITAL COURSE
64yo male with PMH of HTN, TEE, mediastinal and hilar lymphadenopathy s/p negative biopsy (6/2022), and RBBB who was recently diagnosed with atrial fibrillation in the summer of 2023. He developed CP, palpitations and SOB and was taking to the hospital where he was noted to be in AF. A CTA of the chest was performed which was negative. Echo revealed EF of 35-40%. He underwent a DARRIN guided cardioversion in 8/2023 and reported resolution of symptoms. On follow up in September, he was noted to be in recurrent A-fib with symptoms of reduced functional capacity. He was started on amiodarone and was scheduled for another cardioversion but self converted prior to procedure. On followup however, he again was noted to be back in AF. He presented electively on 1/18 for and is now POD #1 status post uncomplicated radiofrequency ablation of atrial fibrillation.

## 2024-01-18 NOTE — DISCHARGE NOTE PROVIDER - NSDCFUADDINST_GEN_ALL_CORE_FT
Follow up with Dr. Fajardo in 2-4 weeks.  Follow up with general cardiology for severe Tricuspid regurgitation   Continue Eliquis without interruption.  Start Protonix twice daily x 2 weeks then decrease to once daily x 6 weeks  Start Carafate twice daily x 2 weeks.  Stop Metoprolol.  Continue all other home medications as per usual.

## 2024-01-18 NOTE — PROGRESS NOTE ADULT - SUBJECTIVE AND OBJECTIVE BOX
PROCEDURE(S): Radiofrequency Ablation of Atrial Fibrillation    ELECTROPHYSIOLOGIST(S): Khalif Fajardo MD         COMPLICATIONS:  none        DISPOSITION:  observation     CONDITION: stable  EBL: <15mL    Pt doing well s/p atrial fibrillation ablation ([facility]/PVI, PW, CTI) via RFV access. Denies complaint.     DARRIN: severe TR. No clot       MEDICATIONS  (STANDING):  aMIOdarone    Tablet 200 milliGRAM(s) Oral daily  apixaban 5 milliGRAM(s) Oral <User Schedule>  furosemide   Injectable 20 milliGRAM(s) IV Push once  pantoprazole    Tablet 40 milliGRAM(s) Oral two times a day  sacubitril 49 mG/valsartan 51 mG 1 Tablet(s) Oral two times a day  sucralfate suspension 1 Gram(s) Oral two times a day    MEDICATIONS  (PRN):  acetaminophen     Tablet .. 650 milliGRAM(s) Oral every 6 hours PRN Mild Pain (1 - 3)  ALPRAZolam 0.25 milliGRAM(s) Oral every 6 hours PRN anxiety/ insomnia  aluminum hydroxide/magnesium hydroxide/simethicone Suspension 30 milliLiter(s) Oral every 4 hours PRN Dyspepsia  benzocaine/menthol Lozenge 1 Lozenge Oral every 2 hours PRN Sore Throat  ondansetron Injectable 4 milliGRAM(s) IV Push every 8 hours PRN Nausea and/or Vomiting      Allergies  No Known Allergies      Exam:   HR:  BP:  RR:   SpO2:     Gen: VSS, NAD, A&O x 3  Card: S1/S2, RRR, no m/g/r  Resp: lungs CTA b/l  Abd: S/NT/ND  Groins: hemostatic sutures in place; sites C/D/I b/l; no bleeding, hematoma, erythema, exudate or edema  Ext: no edema; distal pulses intact      I/Os: net +     ECG:    Assessment:   64yo male with PMH of HTN, TEE, mediastinal and hilar lymphadenopathy s/p negative biopsy (6/2022), and RBBB who was recently diagnosed with atrial fibrillation in the summer of 2023. He developed CP, palpitations and SOB and was taking to the hospital where he was noted to be in AF. A CTA of the chest was performed which was negative. Echo revealed EF of 35-40%. He underwent a DARRIN guided cardioversion in 8/2023 and reported resolution of symptoms. On follow up in September, he was noted to be in recurrent A-fib with symptoms of reduced functional capacity. He was started on amiodarone and was scheduled for another cardioversion but self converted prior to procedure. On followup however, he again was noted to be back in AF. He presented electively on 1/18 for and is now status post uncomplicated radiofrequency ablation of atrial fibrillation.      Plan:   Bedrest x 4 hours, then OOB with assistance and progress as tolerated.   Groin sutures to be removed by EP service in AM.   Pending groin status: 5mg PO Eliquis to resume at 16:30 tonight.   DO NOT HOLD, INTERRUPT OR REVERSE ANTICOAGULATION WITHOUT EXPLICIT APPROVAL FROM EP SERVICE.   Lasix 20mg IV x 1 dose once ambulating, then Lasix 20mg PO daily x 3 days.   Continue Amiodarone.  Discontinue Metoprolol.   Start Protonix 40mg twice daily x 2 weeks, then once daily x 6 weeks.   Start Carafate 1gm BID x 2 weeks.   Continue other home medications.   Strict I/Os.  Please encourage incentive spirometry and ambulation once able.  Observation and monitoring on telemetry overnight with anticipated discharge in the AM and outpt follow up in 2-4 weeks.   Outpatient follow up for severe TR.  PROCEDURE(S): Radiofrequency Ablation of Atrial Fibrillation    ELECTROPHYSIOLOGIST(S): Khalif Fajardo MD         COMPLICATIONS:  none        DISPOSITION:  observation     CONDITION: stable  EBL: <15mL    Pt doing well s/p atrial fibrillation ablation ([facility]/PVI, PW, CTI) via RFV access. Denies complaint.     DRARIN: severe TR. No clot. EF 50-55%. No PFO      MEDICATIONS  (STANDING):  aMIOdarone    Tablet 200 milliGRAM(s) Oral daily  apixaban 5 milliGRAM(s) Oral <User Schedule>  furosemide   Injectable 20 milliGRAM(s) IV Push once  pantoprazole    Tablet 40 milliGRAM(s) Oral two times a day  sacubitril 49 mG/valsartan 51 mG 1 Tablet(s) Oral two times a day  sucralfate suspension 1 Gram(s) Oral two times a day    MEDICATIONS  (PRN):  acetaminophen     Tablet .. 650 milliGRAM(s) Oral every 6 hours PRN Mild Pain (1 - 3)  ALPRAZolam 0.25 milliGRAM(s) Oral every 6 hours PRN anxiety/ insomnia  aluminum hydroxide/magnesium hydroxide/simethicone Suspension 30 milliLiter(s) Oral every 4 hours PRN Dyspepsia  benzocaine/menthol Lozenge 1 Lozenge Oral every 2 hours PRN Sore Throat  ondansetron Injectable 4 milliGRAM(s) IV Push every 8 hours PRN Nausea and/or Vomiting      Allergies  No Known Allergies      Exam:   HR: 56  BP: 89/55  RR: 16  SpO2: 98%    Gen: VSS, NAD, Awake and alert   Card: S1/S2, RRR   Resp: lungs CTA b/l  Abd: S/NT/ND  Groin (right): hemostatic suture in place; site C/D/I b/l; no bleeding, hematoma, erythema, exudate or edema  Ext: no edema; distal pulse intact       I/Os: net + 2054    ECG: Sinus bradycardia at 57bpm, RBBB    Assessment:   64yo male with PMH of HTN, TEE, mediastinal and hilar lymphadenopathy s/p negative biopsy (6/2022), and RBBB who was recently diagnosed with atrial fibrillation in the summer of 2023. He developed CP, palpitations and SOB and was taking to the hospital where he was noted to be in AF. A CTA of the chest was performed which was negative. Echo revealed EF of 35-40%. He underwent a DARRIN guided cardioversion in 8/2023 and reported resolution of symptoms. On follow up in September, he was noted to be in recurrent A-fib with symptoms of reduced functional capacity. He was started on amiodarone and was scheduled for another cardioversion but self converted prior to procedure. On followup however, he again was noted to be back in AF. He presented electively on 1/18 for and is now status post uncomplicated radiofrequency ablation of atrial fibrillation.      Plan:   NS bolus 500cc x 1 now for mild hypotension   Bedrest x 4 hours, then OOB with assistance and progress as tolerated.   Groin sutures to be removed by EP service in AM.   Pending groin status: 5mg PO Eliquis to resume at 16:30 tonight.   DO NOT HOLD, INTERRUPT OR REVERSE ANTICOAGULATION WITHOUT EXPLICIT APPROVAL FROM EP SERVICE.   Lasix 20mg IV x 1 dose once ambulating, then Lasix 20mg PO daily x 3 days.   Continue Amiodarone.  Discontinue Metoprolol.   Start Protonix 40mg twice daily x 2 weeks, then once daily x 6 weeks.   Start Carafate 1gm BID x 2 weeks.   Continue other home medications.   Strict I/Os.  Please encourage incentive spirometry and ambulation once able.  Observation and monitoring on telemetry overnight with anticipated discharge in the AM and outpt follow up in 2-4 weeks.   Outpatient follow up for severe TR.

## 2024-01-18 NOTE — DISCHARGE NOTE PROVIDER - CARE PROVIDER_API CALL
Khalif Fajardo.  Cardiac Electrophysiology  [address] [place] [id number]  Phone: [phone]  Fax: [phone]  Follow Up Time:

## 2024-01-18 NOTE — DISCHARGE NOTE PROVIDER - NSDCFUADDAPPT_GEN_ALL_CORE_FT
Follow up with Dr. Fajardo in 2-4 weeks.  Follow up with general cardiology for severe Tricuspid regurgitation.  Continue Eliquis without interruption.  Start Protonix twice daily x 2 weeks then decrease to once daily x 6 weeks  Start Carafate twice daily x 2 weeks.  Stop Metoprolol.  Continue all other home medications as per usual.    distal

## 2024-01-18 NOTE — DISCHARGE NOTE PROVIDER - NSDCFUSCHEDAPPT_GEN_ALL_CORE_FT
Khalif Fajardo  [facility]  [facility] [address]  Scheduled Appointment: 01/26/2024

## 2024-01-18 NOTE — DISCHARGE NOTE PROVIDER - NSDCMRMEDTOKEN_GEN_ALL_CORE_FT
amiodarone 200 mg oral tablet: 1 tab(s) orally once a day  Eliquis 5 mg oral tablet: 1 tab(s) orally 2 times a day  Entresto 49 mg-51 mg oral tablet: 1 tab(s) orally once a day  metoprolol tartrate 50 mg oral tablet: 1 tab(s) orally once a day   amiodarone 200 mg oral tablet: 1 tab(s) orally once a day  Eliquis 5 mg oral tablet: 1 tab(s) orally 2 times a day  Entresto 49 mg-51 mg oral tablet: 1 tab(s) orally once a day   amiodarone 200 mg oral tablet: 1 tab(s) orally once a day  Eliquis 5 mg oral tablet: 1 tab(s) orally 2 times a day  Entresto 49 mg-51 mg oral tablet: 1 tab(s) orally once a day  pantoprazole 40 mg oral delayed release tablet: 1 tab(s) orally 2 times a day 1 tablet twice daily x 14 days then decrease to once daily x 6 weeks then stop  sucralfate 1 g/10 mL oral suspension: 10 milliliter(s) orally 2 times a day   amiodarone 200 mg oral tablet: 1 tab(s) orally once a day  Eliquis 5 mg oral tablet: 1 tab(s) orally 2 times a day  pantoprazole 40 mg oral delayed release tablet: 1 tab(s) orally 2 times a day 1 tablet twice daily x 14 days then decrease to once daily x 6 weeks then stop  sacubitril-valsartan 49 mg-51 mg oral tablet: 1 tab(s) orally 2 times a day  sucralfate 1 g/10 mL oral suspension: 10 milliliter(s) orally 2 times a day

## 2024-01-18 NOTE — DISCHARGE NOTE PROVIDER - NSDCCPCAREPLAN_GEN_ALL_CORE_FT
PRINCIPAL DISCHARGE DIAGNOSIS  Diagnosis: Unspecified atrial fibrillation  Assessment and Plan of Treatment:       SECONDARY DISCHARGE DIAGNOSES  Diagnosis: TR (tricuspid regurgitation)  Assessment and Plan of Treatment:

## 2024-01-18 NOTE — CHART NOTE - NSCHARTNOTEFT_GEN_A_CORE
Asked by RN to reassess patient with persistent hypotension. Pt with SBP upper 70s to low 90s following procedure. HR upper 50s. Denies any CP, SOB, dizziness, palpitations, abdominal and/or groin pain. Pt mentating normally with distal pulses. Bedside echo performed by Dr. Fajardo- negative for effusion.  IV fluid bolus ordered. Asked by RN to reassess patient with persistent hypotension. Pt with SBP upper 70s to low 90s following procedure. HR upper 50s. Denies any CP, SOB, dizziness, palpitations, abdominal and/or groin pain. Pt mentating normally with distal pulses. Lungs CTA. CVA S1S2 no rubs or gallops appreciated. Abd soft, NTTP. Right groin access site soft without any swelling, bleeding or hematoma.  Bedside echo performed by Dr. Fajardo- negative for effusion.  IV fluid bolus ordered.

## 2024-01-18 NOTE — PROGRESS NOTE ADULT - SUBJECTIVE AND OBJECTIVE BOX
Pt arrived for scheduled DARRIN/AF ablation with Dr. Fajardo. PST performed on 1/11/2024. Labs reviewed. NPO *** Last AC ****    Pt is a 62yo male with PMH of HTN, TEE, mediastinal and hilar lymphadenopathy s/p negative biopsy (6/2022), and RBBB who was recently diagnosed with atrial fibrillation in the summer of 2023. He developed CP, palpitations and SOB and was taking to the hospital where he was noted to be in AF. A CTA of the chest was performed which was negative. Echo revealed EF of 35-40%. He underwent a DARRIN guided cardioversion in 8/2023 and reported resolution of symptoms. On follow up in September, he was noted to be in recurrent A-fib with symptoms of reduced functional capacity. He was started on amiodarone and underwent another successful cardioversion, however, again on followup was noted to be back in AF. He now presents electively for a DARRIN and RFA of atrial fibrillation.   ?    Cardiology summary:  Echo 8/14/2023: EF 35-40% grade 1 diastolic dysfunction, right atrium was severely dilated, severe tricuspid regurg and moderate mitral regurg.  Coronary CT 12/19/2022: Calcium score 0 no CT evidence of coronary disease. Multiple prominent borderline enlarged mediastinal and hilar lymph nodes bilaterally nonspecific 5 mm pulmonary nodule noted.  ?     Pt arrived for scheduled DARRIN/AF ablation with Dr. Fajardo. PST performed on 1/11/2024, denies any interval changes. Labs reviewed. NPO confirmed. Last Eliquis dose yesterday evening.     Pt is a 64yo male with PMH of HTN, TEE, mediastinal and hilar lymphadenopathy s/p negative biopsy (6/2022), and RBBB who was recently diagnosed with atrial fibrillation in the summer of 2023. He developed CP, palpitations and SOB and was taking to the hospital where he was noted to be in AF. A CTA of the chest was performed which was negative. Echo revealed EF of 35-40%. He underwent a DARRIN guided cardioversion in 8/2023 and reported resolution of symptoms. On follow up in September, he was noted to be in recurrent A-fib with symptoms of reduced functional capacity. He was started on amiodarone and was scheduled for another cardioversion but self converted prior to procedure. On followup however, he again was noted to be back in AF. He now presents electively for a DARRIN and RFA of atrial fibrillation.     Home medications:  Metoprolol tartrate 50mg twice daily (recently increase from once daily a couple days ago)  Entresto 49/51mg twice daily  Eliquis 5 mg twice daily  Amiodarone 200mg once daily  ?    Cardiology summary:  Echo 8/14/2023: EF 35-40% grade 1 diastolic dysfunction, right atrium was severely dilated, severe tricuspid regurg and moderate mitral regurg.  Coronary CT 12/19/2022: Calcium score 0 no CT evidence of coronary disease. Multiple prominent borderline enlarged mediastinal and hilar lymph nodes bilaterally nonspecific 5 mm pulmonary nodule noted.  ?

## 2024-01-19 ENCOUNTER — TRANSCRIPTION ENCOUNTER (OUTPATIENT)
Age: 64
End: 2024-01-19

## 2024-01-19 VITALS
DIASTOLIC BLOOD PRESSURE: 75 MMHG | RESPIRATION RATE: 18 BRPM | HEART RATE: 66 BPM | OXYGEN SATURATION: 96 % | SYSTOLIC BLOOD PRESSURE: 121 MMHG

## 2024-01-19 LAB
ANION GAP SERPL CALC-SCNC: 13 MMOL/L — SIGNIFICANT CHANGE UP (ref 5–17)
BUN SERPL-MCNC: 16.6 MG/DL — SIGNIFICANT CHANGE UP (ref 8–20)
CALCIUM SERPL-MCNC: 7.9 MG/DL — LOW (ref 8.4–10.5)
CHLORIDE SERPL-SCNC: 103 MMOL/L — SIGNIFICANT CHANGE UP (ref 96–108)
CO2 SERPL-SCNC: 19 MMOL/L — LOW (ref 22–29)
CREAT SERPL-MCNC: 1.08 MG/DL — SIGNIFICANT CHANGE UP (ref 0.5–1.3)
EGFR: 76 ML/MIN/1.73M2 — SIGNIFICANT CHANGE UP
GLUCOSE SERPL-MCNC: 149 MG/DL — HIGH (ref 70–99)
HCT VFR BLD CALC: 42.2 % — SIGNIFICANT CHANGE UP (ref 39–50)
HGB BLD-MCNC: 13.9 G/DL — SIGNIFICANT CHANGE UP (ref 13–17)
MAGNESIUM SERPL-MCNC: 1.8 MG/DL — SIGNIFICANT CHANGE UP (ref 1.6–2.6)
MCHC RBC-ENTMCNC: 32.4 PG — SIGNIFICANT CHANGE UP (ref 27–34)
MCHC RBC-ENTMCNC: 32.9 GM/DL — SIGNIFICANT CHANGE UP (ref 32–36)
MCV RBC AUTO: 98.4 FL — SIGNIFICANT CHANGE UP (ref 80–100)
PLATELET # BLD AUTO: 202 K/UL — SIGNIFICANT CHANGE UP (ref 150–400)
POTASSIUM SERPL-MCNC: 4.7 MMOL/L — SIGNIFICANT CHANGE UP (ref 3.5–5.3)
POTASSIUM SERPL-SCNC: 4.7 MMOL/L — SIGNIFICANT CHANGE UP (ref 3.5–5.3)
RBC # BLD: 4.29 M/UL — SIGNIFICANT CHANGE UP (ref 4.2–5.8)
RBC # FLD: 17.6 % — HIGH (ref 10.3–14.5)
SODIUM SERPL-SCNC: 135 MMOL/L — SIGNIFICANT CHANGE UP (ref 135–145)
WBC # BLD: 7.71 K/UL — SIGNIFICANT CHANGE UP (ref 3.8–10.5)
WBC # FLD AUTO: 7.71 K/UL — SIGNIFICANT CHANGE UP (ref 3.8–10.5)

## 2024-01-19 PROCEDURE — C1894: CPT

## 2024-01-19 PROCEDURE — C1889: CPT

## 2024-01-19 PROCEDURE — C1887: CPT

## 2024-01-19 PROCEDURE — C1732: CPT

## 2024-01-19 PROCEDURE — 80048 BASIC METABOLIC PNL TOTAL CA: CPT

## 2024-01-19 PROCEDURE — 93656 COMPRE EP EVAL ABLTJ ATR FIB: CPT

## 2024-01-19 PROCEDURE — 93655 ICAR CATH ABLTJ DSCRT ARRHYT: CPT

## 2024-01-19 PROCEDURE — C1759: CPT

## 2024-01-19 PROCEDURE — 93010 ELECTROCARDIOGRAM REPORT: CPT

## 2024-01-19 PROCEDURE — 36415 COLL VENOUS BLD VENIPUNCTURE: CPT

## 2024-01-19 PROCEDURE — 83735 ASSAY OF MAGNESIUM: CPT

## 2024-01-19 PROCEDURE — C1766: CPT

## 2024-01-19 PROCEDURE — 93005 ELECTROCARDIOGRAM TRACING: CPT

## 2024-01-19 PROCEDURE — 85027 COMPLETE CBC AUTOMATED: CPT

## 2024-01-19 PROCEDURE — 93312 ECHO TRANSESOPHAGEAL: CPT

## 2024-01-19 PROCEDURE — C1731: CPT

## 2024-01-19 RX ORDER — SACUBITRIL AND VALSARTAN 24; 26 MG/1; MG/1
1 TABLET, FILM COATED ORAL
Refills: 0 | DISCHARGE

## 2024-01-19 RX ORDER — PANTOPRAZOLE SODIUM 20 MG/1
1 TABLET, DELAYED RELEASE ORAL
Qty: 70 | Refills: 0
Start: 2024-01-19 | End: 2024-02-01

## 2024-01-19 RX ORDER — SACUBITRIL AND VALSARTAN 24; 26 MG/1; MG/1
1 TABLET, FILM COATED ORAL
Qty: 0 | Refills: 0 | DISCHARGE
Start: 2024-01-19

## 2024-01-19 RX ORDER — SUCRALFATE 1 G
10 TABLET ORAL
Qty: 300 | Refills: 0
Start: 2024-01-19 | End: 2024-02-01

## 2024-01-19 RX ORDER — INFLUENZA VIRUS VACCINE 15; 15; 15; 15 UG/.5ML; UG/.5ML; UG/.5ML; UG/.5ML
0.7 SUSPENSION INTRAMUSCULAR ONCE
Refills: 0 | Status: DISCONTINUED | OUTPATIENT
Start: 2024-01-19 | End: 2024-01-19

## 2024-01-19 RX ORDER — MAGNESIUM SULFATE 500 MG/ML
2 VIAL (ML) INJECTION ONCE
Refills: 0 | Status: COMPLETED | OUTPATIENT
Start: 2024-01-19 | End: 2024-01-19

## 2024-01-19 RX ADMIN — APIXABAN 5 MILLIGRAM(S): 2.5 TABLET, FILM COATED ORAL at 05:26

## 2024-01-19 RX ADMIN — AMIODARONE HYDROCHLORIDE 200 MILLIGRAM(S): 400 TABLET ORAL at 05:23

## 2024-01-19 RX ADMIN — Medication 50 GRAM(S): at 10:14

## 2024-01-19 RX ADMIN — Medication 1 GRAM(S): at 05:23

## 2024-01-19 RX ADMIN — PANTOPRAZOLE SODIUM 40 MILLIGRAM(S): 20 TABLET, DELAYED RELEASE ORAL at 05:23

## 2024-01-19 RX ADMIN — Medication 20 MILLIGRAM(S): at 05:22

## 2024-01-19 RX ADMIN — SACUBITRIL AND VALSARTAN 1 TABLET(S): 24; 26 TABLET, FILM COATED ORAL at 05:22

## 2024-01-19 NOTE — PATIENT PROFILE ADULT - VISION (WITH CORRECTIVE LENSES IF THE PATIENT USUALLY WEARS THEM):
Wears reading glasses./Partially impaired: cannot see medication labels or newsprint, but can see obstacles in path, and the surrounding layout; can count fingers at arm's length

## 2024-01-19 NOTE — DISCHARGE NOTE NURSING/CASE MANAGEMENT/SOCIAL WORK - NSDCFUADDAPPT_GEN_ALL_CORE_FT
Follow up with Dr. Fajardo in 2-4 weeks.  Follow up with general cardiology for severe Tricuspid regurgitation.  Continue Eliquis without interruption.  Start Protonix twice daily x 2 weeks then decrease to once daily x 6 weeks  Start Carafate twice daily x 2 weeks.  Stop Metoprolol.  Continue all other home medications as per usual.

## 2024-01-19 NOTE — DISCHARGE NOTE NURSING/CASE MANAGEMENT/SOCIAL WORK - PATIENT PORTAL LINK FT
You can access the FollowMyHealth Patient Portal offered by [facility] by registering at the following website: [email or web address]. By joining [facility]’s FollowMyHealth portal, you will also be able to view your health information using other applications (apps) compatible with our system.

## 2024-01-19 NOTE — PROGRESS NOTE ADULT - SUBJECTIVE AND OBJECTIVE BOX
Pt doing well POD #1 s/p atrial fibrillation ablation (WACA/PVI, PW, CTI) via RFV access. Pt seen and examines, reports feeling well, denies any complaints. Morning labs and telemetry reviewed. Mg noted to be 1.8, will replenish prior to discharge. Right groin suture removed without incident.     EKG: NSR, RBBB  TELE: SR ~60s, RBBB, 3 short salvos of a regular NCT ~115bpm     MEDICATIONS  (STANDING):  aMIOdarone    Tablet 200 milliGRAM(s) Oral daily  apixaban 5 milliGRAM(s) Oral <User Schedule>  furosemide    Tablet 20 milliGRAM(s) Oral daily  magnesium sulfate  IVPB 2 Gram(s) IV Intermittent once  pantoprazole    Tablet 40 milliGRAM(s) Oral two times a day  sacubitril 49 mG/valsartan 51 mG 1 Tablet(s) Oral two times a day  sucralfate suspension 1 Gram(s) Oral two times a day    MEDICATIONS  (PRN):  acetaminophen     Tablet .. 650 milliGRAM(s) Oral every 6 hours PRN Mild Pain (1 - 3)  ALPRAZolam 0.25 milliGRAM(s) Oral every 6 hours PRN anxiety/ insomnia  aluminum hydroxide/magnesium hydroxide/simethicone Suspension 30 milliLiter(s) Oral every 4 hours PRN Dyspepsia  benzocaine/menthol Lozenge 1 Lozenge Oral every 2 hours PRN Sore Throat  ondansetron Injectable 4 milliGRAM(s) IV Push every 8 hours PRN Nausea and/or Vomiting      Allergies  No Known Allergies      PAST MEDICAL & SURGICAL HISTORY:  Atrial fibrillation  TEE (obstructive sleep apnea)  Hypertension  S/P repair of hydrocele      Vital Signs Last 24 Hrs  T(C): 36.5 (19 Jan 2024 07:44), Max: 36.7 (18 Jan 2024 19:44)  T(F): 97.7 (19 Jan 2024 07:44), Max: 98.1 (18 Jan 2024 19:44)  HR: 62 (19 Jan 2024 07:00) (54 - 67)  BP: 111/75 (19 Jan 2024 05:07) (79/53 - 120/66)  BP(mean): 87 (19 Jan 2024 05:07) (63 - 88)  RR: 13 (19 Jan 2024 07:00) (11 - 17)  SpO2: 98% (19 Jan 2024 07:00) (96% - 100%)    Parameters below as of 19 Jan 2024 07:00  Patient On (Oxygen Delivery Method): room air        Physical Exam:  Constitutional: NAD, AAOx3  Cardiovascular: +S1S2 RRR  Pulmonary: CTA b/l, unlabored  Abd: soft NTND +BS  Groin(right): site C/D/I; no bleeding, hematoma, edema  Extremities: no pedal edema, +distal pulses b/l  Neuro: non focal, DEE x4    LABS:                        13.9   7.71  )-----------( 202      ( 19 Jan 2024 05:11 )             42.2     01-19    135  |  103  |  16.6  ----------------------------<  149<H>  4.7   |  19.0<L>  |  1.08    Ca    7.9<L>      19 Jan 2024 05:11  Mg     1.8     01-19        Urinalysis Basic - ( 19 Jan 2024 05:11 )    Color: x / Appearance: x / SG: x / pH: x  Gluc: 149 mg/dL / Ketone: x  / Bili: x / Urobili: x   Blood: x / Protein: x / Nitrite: x   Leuk Esterase: x / RBC: x / WBC x   Sq Epi: x / Non Sq Epi: x / Bacteria: x        Assessment:   64yo male with PMH of HTN, TEE, mediastinal and hilar lymphadenopathy s/p negative biopsy (6/2022), and RBBB who was recently diagnosed with atrial fibrillation in the summer of 2023. He developed CP, palpitations and SOB and was taking to the hospital where he was noted to be in AF. A CTA of the chest was performed which was negative. Echo revealed EF of 35-40%. He underwent a DARRIN guided cardioversion in 8/2023 and reported resolution of symptoms. On follow up in September, he was noted to be in recurrent A-fib with symptoms of reduced functional capacity. He was started on amiodarone and was scheduled for another cardioversion but self converted prior to procedure. On followup however, he again was noted to be back in AF. He presented electively on 1/18 for and is now POD #1 status post uncomplicated radiofrequency ablation of atrial fibrillation.      Plan:   Continue Eliquis without any interruptions.   Continue Amiodarone.  Discontinue Metoprolol.   Protonix 40mg twice daily x 2 weeks, then once daily x 6 weeks.   Carafate 1gm BID x 2 weeks.   Continue other home medications.   Outpatient follow up for severe TR.   Access site care and activity limitations reviewed w/ pt.   Outpt f/up in 2-4 weeks.

## 2024-01-26 ENCOUNTER — APPOINTMENT (OUTPATIENT)
Dept: ELECTROPHYSIOLOGY | Facility: CLINIC | Age: 64
End: 2024-01-26
Payer: MEDICARE

## 2024-01-26 VITALS
OXYGEN SATURATION: 98 % | SYSTOLIC BLOOD PRESSURE: 110 MMHG | BODY MASS INDEX: 28.79 KG/M2 | HEART RATE: 73 BPM | WEIGHT: 190 LBS | HEIGHT: 68 IN | DIASTOLIC BLOOD PRESSURE: 70 MMHG

## 2024-01-26 DIAGNOSIS — I48.91 UNSPECIFIED ATRIAL FIBRILLATION: ICD-10-CM

## 2024-01-26 PROBLEM — I10 ESSENTIAL (PRIMARY) HYPERTENSION: Chronic | Status: ACTIVE | Noted: 2024-01-11

## 2024-01-26 PROBLEM — Z98.890 OTHER SPECIFIED POSTPROCEDURAL STATES: Chronic | Status: ACTIVE | Noted: 2024-01-11

## 2024-01-26 PROBLEM — G47.33 OBSTRUCTIVE SLEEP APNEA (ADULT) (PEDIATRIC): Chronic | Status: ACTIVE | Noted: 2024-01-11

## 2024-01-26 PROCEDURE — 93000 ELECTROCARDIOGRAM COMPLETE: CPT

## 2024-01-26 PROCEDURE — 99213 OFFICE O/P EST LOW 20 MIN: CPT

## 2024-01-26 RX ORDER — METOPROLOL TARTRATE 50 MG/1
50 TABLET, FILM COATED ORAL DAILY
Qty: 90 | Refills: 3 | Status: DISCONTINUED | COMMUNITY
End: 2024-01-26

## 2024-01-26 RX ORDER — PANTOPRAZOLE 40 MG/1
40 TABLET, DELAYED RELEASE ORAL
Refills: 0 | Status: ACTIVE | COMMUNITY

## 2024-01-26 NOTE — PHYSICAL EXAM
[No Acute Distress] : no acute distress [Normal Venous Pressure] : normal venous pressure [Normal S1, S2] : normal S1, S2 [Clear Lung Fields] : clear lung fields [Normal Gait] : normal gait [No Edema] : no edema [Alert and Oriented] : alert and oriented

## 2024-01-30 PROBLEM — I48.91 AFIB: Status: ACTIVE | Noted: 2023-08-22

## 2024-05-05 NOTE — HISTORY OF PRESENT ILLNESS
[FreeTextEntry1] : f/u for AF feeling well.  s/p AF ablation 1/18/24 Previous hx: AF started over this past summer ( 2023): He was having some chest discomfort/shortness of breath and he called his physician recommended he go to emergency room.  He was picked up by EMT who then told him that he had A-fib.  He has had prior intermittent palpitations.  Patient was treated with furosemide as well and his diltiazem.  He was then noted to have a right bundle branch block as well.  His TSH was reported normal.  His troponins were normal.  D-dimers were elevated.  He had a CTA of the chest which was negative.  At that time he had had  moderate alcohol intake.  The patient had an echocardiogram performed that showed an EF of 35 to 40%.  He was anticoagulated with Eliquis 5 mg twice daily treated with diltiazem 120 mg 24 hours and metoprolol tartrate 50 mg twice daily. He underwent a DARRIN cardioversion August 25, 2023.  The patient was seen in follow-up September 14, 2023 and was noted to be in recurrent A-fib again with symptoms of reduced functional capacity.  He was then started on amiodarone.  He was scheduled to have another cardioversion procedure but on the day of the procedure he was noted to be in sinus rhythm.  He again saw his cardiologist on October 5, 2023 and again was noted to be in A-fib with moderate ventricular response.  He was maintained on amiodarone 200 mg/day.  His diltiazem was discontinued because of cardiomyopathy and symptoms.  Patient remained on Eliquis Entresto and metoprolol. He was consuming a moderate amount of alcohol up to the summer when his A-fib was diagnosed.  Since then he has markedly reduced his amount of alcohol intake.  Is also dropped about 25 pounds since then. He has a prior history of hypertension and moderate obstructive sleep apnea.  Echocardiogram performed 8/14/2023 showed EF 35 to 40% grade 1 diastolic dysfunction, right atrium was severely dilated, severe tricuspid regurg and moderate mitral regurg.  Sleep study performed 9/25/2023 showed an AHI of 17.7.  Coronary CT performed 12/19/2022: Calcium score 0 no CT evidence of coronary disease.  Multiple prominent borderline enlarged mediastinal and hilar lymph nodes bilaterally nonspecific 5 mm pulmonary nodule noted.  So thoracic surgery 6/17/2022 because of mediastinal lymphadenopathy and right lung nodule.  The feeling was the lesions were benign but needs surveillance CT scans.

## 2024-05-05 NOTE — DISCUSSION/SUMMARY
[EKG obtained to assist in diagnosis and management of assessed problem(s)] : EKG obtained to assist in diagnosis and management of assessed problem(s) [FreeTextEntry1] : Currently in SR s/p  Atrial fibrillation ablation 1/80/24: PVI and posterior wall with CTI flutter line.  Follow-up doing well without any recurrence of A-fib. Continue on anticoagulation Continue on amiodarone Follow-up echocardiogramFollow-up with electrophysiology in 3 months.

## 2024-05-08 ENCOUNTER — NON-APPOINTMENT (OUTPATIENT)
Age: 64
End: 2024-05-08

## 2024-05-08 ENCOUNTER — APPOINTMENT (OUTPATIENT)
Dept: ELECTROPHYSIOLOGY | Facility: CLINIC | Age: 64
End: 2024-05-08
Payer: MEDICARE

## 2024-05-08 DIAGNOSIS — Z98.890 OTHER SPECIFIED POSTPROCEDURAL STATES: ICD-10-CM

## 2024-05-08 DIAGNOSIS — I42.8 OTHER CARDIOMYOPATHIES: ICD-10-CM

## 2024-05-08 DIAGNOSIS — Z86.79 OTHER SPECIFIED POSTPROCEDURAL STATES: ICD-10-CM

## 2024-05-08 DIAGNOSIS — I10 ESSENTIAL (PRIMARY) HYPERTENSION: ICD-10-CM

## 2024-05-08 PROCEDURE — 99213 OFFICE O/P EST LOW 20 MIN: CPT

## 2024-05-08 PROCEDURE — 93000 ELECTROCARDIOGRAM COMPLETE: CPT

## 2024-05-08 RX ORDER — AMIODARONE HYDROCHLORIDE 200 MG/1
200 TABLET ORAL DAILY
Qty: 90 | Refills: 1 | Status: ACTIVE | COMMUNITY
Start: 2023-09-14 | End: 1900-01-01

## 2024-05-08 NOTE — DISCUSSION/SUMMARY
[FreeTextEntry1] : Currently in SR s/p  Atrial fibrillation ablation 1/80/24: PVI and posterior wall with CTI flutter line.  Follow-up doing well without any recurrence of A-fib. Continue on anticoagulation  Will reduce his dose of amiodarone from 200 mg to 100 mg.  Plan is to eventually stop the medication within the next 1 to 3 months. Lifestyle and risk factor modification for prevention of atrial fibrillation: He has a few cocktails a night and will ask him to reduce the amount given the previous LV dysfunction and also possible to trigger more A-fib in the future.  Previous sleep study with an AHI of 17.7.  He does not have daytime hypersomnolence.  Blood pressure is controlled.  BMI 28.9. Follow-up echocardiogram ordered today. f/u EP 6 months [EKG obtained to assist in diagnosis and management of assessed problem(s)] : EKG obtained to assist in diagnosis and management of assessed problem(s)

## 2024-05-08 NOTE — HISTORY OF PRESENT ILLNESS
[FreeTextEntry1] : Mir is a 63-year-old man who is seen in follow-up for evaluation status post catheter ablation for atrial fibrillation performed on 1/18/2024. The patient feels well and has no symptoms such as shortness of breath or palpitations.  He has been very active and busy working without symptoms  His EKG does showed a right bundle branch block which she has had at baseline.  This is unchanged.  He is currently on amiodarone 200 mg/day Eliquis 5 mg twice daily Entresto.  Previous hx: AF started over this past summer ( 2023): He was having some chest discomfort/shortness of breath and he called his physician recommended he go to emergency room.  He was picked up by EMT who then told him that he had A-fib.  He has had prior intermittent palpitations.  Patient was treated with furosemide as well and his diltiazem.  He was then noted to have a right bundle branch block as well.  His TSH was reported normal.  His troponins were normal.  D-dimers were elevated.  He had a CTA of the chest which was negative.  At that time he had had  moderate alcohol intake.  The patient had an echocardiogram performed that showed an EF of 35 to 40%.  He was anticoagulated with Eliquis 5 mg twice daily treated with diltiazem 120 mg 24 hours and metoprolol tartrate 50 mg twice daily. He underwent a DARRIN cardioversion August 25, 2023.  The patient was seen in follow-up September 14, 2023 and was noted to be in recurrent A-fib again with symptoms of reduced functional capacity.  He was then started on amiodarone.  He was scheduled to have another cardioversion procedure but on the day of the procedure he was noted to be in sinus rhythm.  He again saw his cardiologist on October 5, 2023 and again was noted to be in A-fib with moderate ventricular response.  He was maintained on amiodarone 200 mg/day.  His diltiazem was discontinued because of cardiomyopathy and symptoms.  Patient remained on Eliquis Entresto and metoprolol. He was consuming a moderate amount of alcohol up to the summer when his A-fib was diagnosed.  Since then he has markedly reduced his amount of alcohol intake.  Is also dropped about 25 pounds since then. He has a prior history of hypertension and moderate obstructive sleep apnea.  Echocardiogram performed 8/14/2023 showed EF 35 to 40% grade 1 diastolic dysfunction, right atrium was severely dilated, severe tricuspid regurg and moderate mitral regurg.  Sleep study performed 9/25/2023 showed an AHI of 17.7.  Coronary CT performed 12/19/2022: Calcium score 0 no CT evidence of coronary disease.  Multiple prominent borderline enlarged mediastinal and hilar lymph nodes bilaterally nonspecific 5 mm pulmonary nodule noted.  So thoracic surgery 6/17/2022 because of mediastinal lymphadenopathy and right lung nodule.  The feeling was the lesions were benign but needs surveillance CT scans.

## 2024-06-04 RX ORDER — APIXABAN 5 MG/1
5 TABLET, FILM COATED ORAL
Qty: 90 | Refills: 1 | Status: ACTIVE | COMMUNITY
Start: 1900-01-01 | End: 1900-01-01

## 2024-07-28 ENCOUNTER — NON-APPOINTMENT (OUTPATIENT)
Age: 64
End: 2024-07-28

## 2024-09-09 ENCOUNTER — APPOINTMENT (OUTPATIENT)
Dept: CARDIOLOGY | Facility: CLINIC | Age: 64
End: 2024-09-09
Payer: MEDICARE

## 2024-09-09 PROCEDURE — 93306 TTE W/DOPPLER COMPLETE: CPT

## 2024-09-18 ENCOUNTER — NON-APPOINTMENT (OUTPATIENT)
Age: 64
End: 2024-09-18

## 2024-09-18 ENCOUNTER — APPOINTMENT (OUTPATIENT)
Dept: ELECTROPHYSIOLOGY | Facility: CLINIC | Age: 64
End: 2024-09-18

## 2024-09-18 VITALS
WEIGHT: 192 LBS | OXYGEN SATURATION: 98 % | SYSTOLIC BLOOD PRESSURE: 122 MMHG | DIASTOLIC BLOOD PRESSURE: 80 MMHG | RESPIRATION RATE: 16 BRPM | HEIGHT: 68 IN | HEART RATE: 71 BPM | BODY MASS INDEX: 29.1 KG/M2

## 2024-09-18 PROCEDURE — 99213 OFFICE O/P EST LOW 20 MIN: CPT

## 2024-09-18 PROCEDURE — 93000 ELECTROCARDIOGRAM COMPLETE: CPT

## 2024-09-18 RX ORDER — SILDENAFIL 50 MG/1
50 TABLET ORAL
Qty: 7 | Refills: 0 | Status: ACTIVE | COMMUNITY
Start: 2024-09-18 | End: 1900-01-01

## 2024-09-18 NOTE — HISTORY OF PRESENT ILLNESS
[FreeTextEntry1] : Patient is doing extremely well without any recurrence of A-fib his EKG was reviewed he is in sinus rhythm his echo was also reviewed and discussed with the patient.  Will stop the amiodarone and he will be seen in follow-up in 3 months.  He has been on Entresto and he questions whether this should be continued or not.  Recommended to continue for next 3 months.  Likely continue for next 3 months.  Will discuss with his cardiologist whether long-term Entresto could be discontinued for now I will renew it.

## 2024-12-11 ENCOUNTER — APPOINTMENT (OUTPATIENT)
Dept: ELECTROPHYSIOLOGY | Facility: CLINIC | Age: 64
End: 2024-12-11

## 2024-12-11 ENCOUNTER — NON-APPOINTMENT (OUTPATIENT)
Age: 64
End: 2024-12-11

## 2024-12-11 VITALS
OXYGEN SATURATION: 97 % | DIASTOLIC BLOOD PRESSURE: 78 MMHG | WEIGHT: 193 LBS | SYSTOLIC BLOOD PRESSURE: 134 MMHG | HEIGHT: 68 IN | BODY MASS INDEX: 29.25 KG/M2 | HEART RATE: 70 BPM

## 2024-12-11 DIAGNOSIS — Z98.890 OTHER SPECIFIED POSTPROCEDURAL STATES: ICD-10-CM

## 2024-12-11 DIAGNOSIS — Z86.79 OTHER SPECIFIED POSTPROCEDURAL STATES: ICD-10-CM

## 2024-12-11 PROCEDURE — 93000 ELECTROCARDIOGRAM COMPLETE: CPT

## 2024-12-11 PROCEDURE — 99213 OFFICE O/P EST LOW 20 MIN: CPT

## 2025-01-23 ENCOUNTER — NON-APPOINTMENT (OUTPATIENT)
Age: 65
End: 2025-01-23

## 2025-01-28 ENCOUNTER — LABORATORY RESULT (OUTPATIENT)
Age: 65
End: 2025-01-28

## 2025-06-10 ENCOUNTER — APPOINTMENT (OUTPATIENT)
Dept: ELECTROPHYSIOLOGY | Facility: CLINIC | Age: 65
End: 2025-06-10

## 2025-06-10 VITALS
DIASTOLIC BLOOD PRESSURE: 72 MMHG | SYSTOLIC BLOOD PRESSURE: 126 MMHG | OXYGEN SATURATION: 98 % | BODY MASS INDEX: 29.4 KG/M2 | WEIGHT: 194 LBS | HEIGHT: 68 IN | HEART RATE: 73 BPM

## 2025-06-10 PROCEDURE — 93000 ELECTROCARDIOGRAM COMPLETE: CPT

## 2025-06-10 PROCEDURE — 99213 OFFICE O/P EST LOW 20 MIN: CPT

## 2025-09-08 ENCOUNTER — APPOINTMENT (OUTPATIENT)
Dept: CARDIOLOGY | Facility: CLINIC | Age: 65
End: 2025-09-08
Payer: MEDICARE

## 2025-09-08 PROCEDURE — 93306 TTE W/DOPPLER COMPLETE: CPT
